# Patient Record
Sex: FEMALE | Employment: UNEMPLOYED | URBAN - METROPOLITAN AREA
[De-identification: names, ages, dates, MRNs, and addresses within clinical notes are randomized per-mention and may not be internally consistent; named-entity substitution may affect disease eponyms.]

---

## 2017-03-23 ENCOUNTER — TELEPHONE (OUTPATIENT)
Dept: FAMILY MEDICINE | Facility: CLINIC | Age: 57
End: 2017-03-23

## 2017-03-23 DIAGNOSIS — E11.9 TYPE 2 DIABETES MELLITUS WITHOUT COMPLICATION (H): ICD-10-CM

## 2017-03-23 NOTE — TELEPHONE ENCOUNTER
metformin         Last Written Prescription Date: 07/28/16  Last Fill Quantity: 180, # refills: 1  Last Office Visit with [facility], UMP or  Health prescribing provider:  09/07/16   Next 5 appointments (look out 90 days)     Mar 27, 2017 11:00 AM CDT   Office Visit with Rachna Aden MD   [facility] ([facility])    [address] [place] [id number] [phone]                   BP Readings from Last 3 Encounters:   09/07/16 120/70   07/28/16 112/76   06/07/16 124/80     Lab Results   Component Value Date    MICROL 22 06/07/2016     No results found for: MICROALBUMIN  Creatinine   Date Value Ref Range Status   07/21/2016 0.62 0.52 - 1.04 mg/dL Final   ]  GFR Estimate   Date Value Ref Range Status   07/21/2016 >90  Non  GFR Calc   >60 mL/min/1.7m2 Final   08/27/2015 >90  Non  GFR Calc   >60 mL/min/1.7m2 Final   04/28/2014 >90 >60 mL/min/1.7m2 Final     GFR Estimate If Black   Date Value Ref Range Status   07/21/2016 >90   GFR Calc   >60 mL/min/1.7m2 Final   08/27/2015 >90   GFR Calc   >60 mL/min/1.7m2 Final   04/28/2014 >90 >60 mL/min/1.7m2 Final     Lab Results   Component Value Date    CHOL 148 07/21/2016     Lab Results   Component Value Date    HDL 46 07/21/2016     Lab Results   Component Value Date    LDL 82 07/21/2016     Lab Results   Component Value Date    TRIG 100 07/21/2016     Lab Results   Component Value Date    CHOLHDLRATIO 3.0 08/27/2015     Lab Results   Component Value Date    AST 16 07/21/2016     Lab Results   Component Value Date    ALT 33 07/21/2016     Lab Results   Component Value Date    A1C 6.5 07/21/2016    A1C 6.2 02/24/2016    A1C 6.0 08/27/2015    A1C 8.0 04/30/2015    A1C 6.6 01/26/2012     Potassium   Date Value Ref Range Status   07/21/2016 4.0 3.4 - 5.3 mmol/L Final       Ok x one refill- patient has appointment scheduled    Portia Iqbal,RN  [facility]  [facility]  [phone]

## 2017-03-23 NOTE — TELEPHONE ENCOUNTER
Reason for Call:  Medication or medication refill:    Do you use a [facility]?  Name of the pharmacy and phone number for the current request:  [facility] in [place]    Name of the medication requested: Medformin    Other request: no    Can we leave a detailed message on this number? YES    Phone number patient can be reached at: Home number on file [phone] (home)    Best Time: anytime    Call taken on 3/23/2017 at 9:56 AM by Ashleigh Nettles

## 2017-03-27 ENCOUNTER — OFFICE VISIT (OUTPATIENT)
Dept: FAMILY MEDICINE | Facility: CLINIC | Age: 57
End: 2017-03-27
Payer: COMMERCIAL

## 2017-03-27 VITALS
HEART RATE: 78 BPM | BODY MASS INDEX: 34.95 KG/M2 | OXYGEN SATURATION: 99 % | DIASTOLIC BLOOD PRESSURE: 81 MMHG | TEMPERATURE: 98.2 F | WEIGHT: 178 LBS | HEIGHT: 60 IN | SYSTOLIC BLOOD PRESSURE: 130 MMHG

## 2017-03-27 DIAGNOSIS — Z71.89 COUNSELING REGARDING ADVANCED DIRECTIVES: ICD-10-CM

## 2017-03-27 DIAGNOSIS — E78.5 HYPERLIPIDEMIA LDL GOAL <100: ICD-10-CM

## 2017-03-27 DIAGNOSIS — E11.9 TYPE 2 DIABETES MELLITUS WITHOUT COMPLICATION, WITHOUT LONG-TERM CURRENT USE OF INSULIN (H): Primary | ICD-10-CM

## 2017-03-27 LAB — HBA1C MFR BLD: 8.4 % (ref 4.3–6)

## 2017-03-27 PROCEDURE — 36415 COLL VENOUS BLD VENIPUNCTURE: CPT | Performed by: FAMILY MEDICINE

## 2017-03-27 PROCEDURE — 99214 OFFICE O/P EST MOD 30 MIN: CPT | Performed by: FAMILY MEDICINE

## 2017-03-27 PROCEDURE — 83036 HEMOGLOBIN GLYCOSYLATED A1C: CPT | Performed by: FAMILY MEDICINE

## 2017-03-27 RX ORDER — ATORVASTATIN CALCIUM 10 MG/1
10 TABLET, FILM COATED ORAL DAILY
Qty: 90 TABLET | Refills: 1 | Status: SHIPPED | OUTPATIENT
Start: 2017-03-27 | End: 2017-08-09

## 2017-03-27 NOTE — PROGRESS NOTES
SUBJECTIVE:                                                    Lis Kevin is a 57 year old female who presents to clinic today for the following health issues:      Diabetes Follow-up    Patient is checking blood sugars: twice daily.    Blood sugar testing frequency justification:   Results are as follows:         am - 119 bed time 130     Diabetic concerns: None, could do better with exercise. Has gained weight      Symptoms of hypoglycemia (low blood sugar): none     Paresthesias (numbness or burning in feet) or sores: No     Date of last diabetic eye exam: last year        Amount of exercise or physical activity: None    Problems taking medications regularly: No    Medication side effects: none    Diet: regular (no restrictions)      Hyperlipidemia Follow-Up      Rate your low fat/cholesterol diet?: good, although not as much exercise lately in winter     Taking statin?  Yes, no muscle aches from statin, need refill on med's     Other lipid medications/supplements?:  none       Problem list and histories reviewed & adjusted, as indicated.  Additional history: as documented    Patient Active Problem List   Diagnosis     Abnormal weight gain     Dyspepsia and other specified disorders of function of stomach     Other acne     Calcific tendonitis     Abnormal LFTs     Abnormal glucose     Other postprocedural status(V45.89)     Dysmetabolic syndrome X     Hyperlipidemia LDL goal <100     Pain in joint, shoulder region     Dry eyes     Hyperplastic colon polyp     Serrated adenoma of colon     Backache     Gall stone     DM type 2 goal A1C below 7.5     Fatty liver     Type 2 diabetes mellitus without complication (H)     Obesity, Class II, BMI 35-39.9, with comorbidity (H)     Symptomatic menopausal or female climacteric states     Bilateral thumb pain     Past Surgical History:   Procedure Laterality Date     C DEXA INTERPRETATION, AXIAL  8/10    normal bone mineral density      C NONSPECIFIC PROCEDURE       "BLT     COLONOSCOPY  3/12    repeat in 2015       Social History   Substance Use Topics     Smoking status: Never Smoker     Smokeless tobacco: Never Used     Alcohol use No     Family History   Problem Relation Age of Onset     DIABETES Sister      Hypertension Mother      Hyperlipidemia No family hx of      Coronary Artery Disease No family hx of      Cancer - colorectal No family hx of      Breast Cancer No family hx of            Reviewed and updated as needed this visit by clinical staff  Tobacco  Allergies  Meds       Reviewed and updated as needed this visit by Provider         ROS:  Constitutional, HEENT, cardiovascular, pulmonary, GI, , musculoskeletal, neuro, skin, endocrine and psych systems are negative, except as otherwise noted.    OBJECTIVE:                                                    /81 (BP Location: Left arm, Cuff Size: Adult Large)  Pulse 78  Temp 98.2  F (36.8  C) (Tympanic)  Ht 4' 11.84\" (1.52 m)  Wt 178 lb (80.7 kg)  LMP 06/10/2009  SpO2 99%  BMI 34.95 kg/m2  Body mass index is 34.95 kg/(m^2).  GENERAL: healthy, alert and no distress  NECK: no adenopathy, no asymmetry, masses, or scars and thyroid normal to palpation  RESP: lungs clear to auscultation - no rales, rhonchi or wheezes  CV: regular rate and rhythm, normal S1 S2, no S3 or S4,   ABDOMEN: soft, nontender, no hepatosplenomegaly, no masses and bowel sounds normal  MS:, no edema           ASSESSMENT/PLAN:                                                        (E11.9) Type 2 diabetes mellitus without complication, without long-term current use of insulin (H)  (primary encounter diagnosis)  Comment:   Plan: HEMOGLOBIN A1C, metFORMIN (GLUCOPHAGE) 500 MG         tablet            (E78.5) Hyperlipidemia LDL goal <100  Comment:   Plan: atorvastatin (LIPITOR) 10 MG tablet            (Z71.89) Counseling regarding advanced directives  Comment:   Plan: HONORING CHOICES REFERRAL            (Z68.35) BMI " 35.0-35.9,adult  Comment:   Plan: Healthy diet and exercise reviewed.   Risks of obesity discussed.  Encourage exercise.      Check labs  Refill sent  See pt instruction   Patient expressed understanding and agreement with treatment plan. All patient's questions were answered, will let me know if has more later.  Medications: Rx's: Reviewed the potential side effects/complications of medications prescribed.       Rachna Aden MD  [facility]

## 2017-03-27 NOTE — LETTER
[facility]   [address] [place] [address]   [phone]      March 29, 2017    Lis Kevin  [address] [place] [address]            Dear Ms. Kevin    Hemoglobin A1C ( DM test) is high and shows inadequate control of your diabetes. You  have to increase your dose of metformin to two tab daily.    You will need to monitor glucose closely.     . Also should  continue to manage your DM with diet control and exercise. You should do retest  in 2-3  months    I suggest you do follow up office visit check to discuss results and adjust your med's if you have questions or concerns.     Results for orders placed or performed in visit on 03/27/17   HEMOGLOBIN A1C   Result Value Ref Range    Hemoglobin A1C 8.4 (H) 4.3 - 6.0 %             Sincerely,   Rachna Aden M.D.

## 2017-03-27 NOTE — MR AVS SNAPSHOT
After Visit Summary   3/27/2017    Lis Kevin    MRN: [id number]           Patient Information     Date Of Birth          1960        Visit Information        Provider Department      3/27/2017 10:45 AM Rachna Aden MD; [facility]        Today's Diagnoses     Type 2 diabetes mellitus without complication, without long-term current use of insulin (H)    -  1    Hyperlipidemia LDL goal <100        Counseling regarding advanced directives        BMI 35.0-35.9,adult          Care Instructions    Check labs  Refill sent  Please schedule eye exam  Follow up check in 06/2017, sooner if problem         Follow-ups after your visit        Additional Services     HONORING CHOICES REFERRAL       Your provider has referred you to Advance Directive Counseling with our [employer] [facility].    Reason for Referral: Facilitate General Advance Care Planning    The [employer] Representative will call you to schedule your appointment, unless your appointment was already scheduled at your clinic.  You may also call the [employer] Representative at [phone] to schedule your appointment.                  Who to contact     If you have questions or need follow up information about today's clinic visit or your schedule please contact [facility] directly at [phone].  Normal or non-critical lab and imaging results will be communicated to you by MyChart, letter or phone within 4 business days after the clinic has received the results. If you do not hear from us within 7 days, please contact the clinic through MyChart or phone. If you have a critical or abnormal lab result, we will notify you by phone as soon as possible.  Submit refill requests through [employer] or call your pharmacy and they will forward the refill request to us. Please allow 3 business days for your refill to be completed.          Additional  "Information About Your Visit        MyChart Information     [employer] lets you send messages to your doctor, view your test results, renew your prescriptions, schedule appointments and more. To sign up, go to [email or web address] . Click on \"Log in\" on the left side of the screen, which will take you to the Welcome page. Then click on \"Sign up Now\" on the right side of the page.     You will be asked to enter the access code listed below, as well as some personal information. Please follow the directions to create your username and password.     Your access code is: [email or web address]  Expires: 2017 11:40 AM     Your access code will  in 90 days. If you need help or a new code, please call your [facility] clinic or [phone].        Care EveryWhere ID     This is your Care EveryWhere ID. This could be used by other organizations to access your [facility] medical records  [id number]        Your Vitals Were     Pulse Temperature Height Last Period Pulse Oximetry BMI (Body Mass Index)    78 98.2  F (36.8  C) (Tympanic) 4' 11.84\" (1.52 m) 06/10/2009 99% 34.95 kg/m2       Blood Pressure from Last 3 Encounters:   17 130/81   16 120/70   16 112/76    Weight from Last 3 Encounters:   17 178 lb (80.7 kg)   16 177 lb (80.3 kg)   16 173 lb 6.4 oz (78.7 kg)              We Performed the Following     HEMOGLOBIN A1C     HONORING CHOICES REFERRAL          Where to get your medicines      These medications were sent to [id number] - [place], [place] - [address] AT [facility] [address] [place] [address]  [address], [place] [address]    Hours:  24-hours Phone:  [phone]     atorvastatin 10 MG tablet    metFORMIN 500 MG tablet          Primary Care Provider Office Phone # Fax #    Rachna Aden [phone]       [facility] [place] [address] [place] [address] [address]  [place] [address]        Thank you!     Thank you for choosing [facility] [place]  for your care. Our goal is always to provide you with excellent care. Hearing back from our patients is one way we can continue to improve our services. Please take a few minutes to complete the written survey that you may receive in the mail after your visit with us. Thank you!             Your Updated Medication List - Protect others around you: Learn how to safely use, store and throw away your medicines at www.disposemymeds.org.          This list is accurate as of: 3/27/17 11:40 AM.  Always use your most recent med list.                   Brand Name Dispense Instructions for use    ACE NOT PRESCRIBED (INTENTIONAL)     0 each    ACE Inhibitor not prescribed due to Other:not needed       aspirin 81 MG EC tablet     90 tablet    Take 1 tablet (81 mg) by mouth daily       atorvastatin 10 MG tablet    LIPITOR    90 tablet    Take 1 tablet (10 mg) by mouth daily       blood glucose monitoring test strip    ACCU-CHEK SMARTVIEW    100 strip    Test 3 times a day       metFORMIN 500 MG tablet    GLUCOPHAGE    180 tablet    Take 1 tablet (500 mg) by mouth 2 times daily (with meals)       vitamin B complex with vitamin C Tabs tablet      Take 1 tablet by mouth daily       vitamin D 2000 UNITS tablet      Take 1 tablet by mouth daily.

## 2017-03-31 DIAGNOSIS — E11.9 TYPE 2 DIABETES MELLITUS WITHOUT COMPLICATION, WITHOUT LONG-TERM CURRENT USE OF INSULIN (H): ICD-10-CM

## 2017-05-01 ENCOUNTER — OFFICE VISIT (OUTPATIENT)
Dept: FAMILY MEDICINE | Facility: CLINIC | Age: 57
End: 2017-05-01
Payer: COMMERCIAL

## 2017-05-01 VITALS
BODY MASS INDEX: 36.29 KG/M2 | DIASTOLIC BLOOD PRESSURE: 64 MMHG | OXYGEN SATURATION: 100 % | HEART RATE: 64 BPM | SYSTOLIC BLOOD PRESSURE: 112 MMHG | WEIGHT: 180 LBS | TEMPERATURE: 97.2 F | RESPIRATION RATE: 16 BRPM | HEIGHT: 59 IN

## 2017-05-01 DIAGNOSIS — N94.9 VAGINAL DISCOMFORT: ICD-10-CM

## 2017-05-01 DIAGNOSIS — N30.00 ACUTE CYSTITIS WITHOUT HEMATURIA: Primary | ICD-10-CM

## 2017-05-01 DIAGNOSIS — R30.9 PAIN WITH URINATION: ICD-10-CM

## 2017-05-01 DIAGNOSIS — E11.9 TYPE 2 DIABETES MELLITUS WITHOUT COMPLICATION, WITHOUT LONG-TERM CURRENT USE OF INSULIN (H): ICD-10-CM

## 2017-05-01 LAB
BACTERIA #/AREA URNS HPF: ABNORMAL /HPF
MICRO REPORT STATUS: NORMAL
NON-SQ EPI CELLS #/AREA URNS LPF: ABNORMAL /LPF
RBC #/AREA URNS AUTO: ABNORMAL /HPF (ref 0–2)
SPECIMEN SOURCE: NORMAL
WBC #/AREA URNS AUTO: ABNORMAL /HPF (ref 0–2)
WET PREP SPEC: NORMAL

## 2017-05-01 PROCEDURE — 87210 SMEAR WET MOUNT SALINE/INK: CPT | Performed by: PHYSICIAN ASSISTANT

## 2017-05-01 PROCEDURE — 99213 OFFICE O/P EST LOW 20 MIN: CPT | Performed by: PHYSICIAN ASSISTANT

## 2017-05-01 PROCEDURE — 81015 MICROSCOPIC EXAM OF URINE: CPT | Performed by: PHYSICIAN ASSISTANT

## 2017-05-01 RX ORDER — NITROFURANTOIN 25; 75 MG/1; MG/1
100 CAPSULE ORAL 2 TIMES DAILY
Qty: 14 CAPSULE | Refills: 0 | Status: SHIPPED | OUTPATIENT
Start: 2017-05-01 | End: 2018-02-08

## 2017-05-01 NOTE — MR AVS SNAPSHOT
"              After Visit Summary   5/1/2017    Lis Kevin    MRN: [id number]           Patient Information     Date Of Birth          1960        Visit Information        Provider Department      5/1/2017 12:40 PM Sofie Downing PA-C [facility] [place]        Today's Diagnoses     Acute cystitis without hematuria    -  1    Pain with urination        Obesity, Class II, BMI 35-39.9, with comorbidity (H)        Type 2 diabetes mellitus without complication, without long-term current use of insulin (H)        Vaginal discomfort           Follow-ups after your visit        Follow-up notes from your care team     Return if symptoms worsen or fail to improve.      Who to contact     If you have questions or need follow up information about today's clinic visit or your schedule please contact [facility] [place] directly at [phone].  Normal or non-critical lab and imaging results will be communicated to you by MyChart, letter or phone within 4 business days after the clinic has received the results. If you do not hear from us within 7 days, please contact the clinic through [employer] or phone. If you have a critical or abnormal lab result, we will notify you by phone as soon as possible.  Submit refill requests through [employer] or call your pharmacy and they will forward the refill request to us. Please allow 3 business days for your refill to be completed.          Additional Information About Your Visit        MyChart Information     [employer] lets you send messages to your doctor, view your test results, renew your prescriptions, schedule appointments and more. To sign up, go to [email or web address] . Click on \"Log in\" on the left side of the screen, which will take you to the Welcome page. Then click on \"Sign up Now\" on the right side of the page.     You will be asked to enter the access code listed below, as well as some personal information. Please follow the directions " "to create your username and password.     Your access code is: [email or web address]  Expires: 2017 11:40 AM     Your access code will  in 90 days. If you need help or a new code, please call your [facility] clinic or [phone].        Care EveryWhere ID     This is your Care EveryWhere ID. This could be used by other organizations to access your [facility] medical records  [id number]        Your Vitals Were     Pulse Temperature Respirations Height Last Period Pulse Oximetry    64 97.2  F (36.2  C) 16 4' 11\" (1.499 m) 06/10/2009 100%    BMI (Body Mass Index)                   36.36 kg/m2            Blood Pressure from Last 3 Encounters:   17 112/64   17 130/81   16 120/70    Weight from Last 3 Encounters:   17 180 lb (81.6 kg)   17 178 lb (80.7 kg)   16 177 lb (80.3 kg)              We Performed the Following     Urine Microscopic     Wet prep          Today's Medication Changes          These changes are accurate as of: 17  2:27 PM.  If you have any questions, ask your nurse or doctor.               Start taking these medicines.        Dose/Directions    nitrofurantoin (macrocrystal-monohydrate) 100 MG capsule   Commonly known as:  MACROBID   Used for:  Acute cystitis without hematuria   Started by:  Sofie Downing PA-C        Dose:  100 mg   Take 1 capsule (100 mg) by mouth 2 times daily   Quantity:  14 capsule   Refills:  0            Where to get your medicines      These medications were sent to [facility] [place] - [place], [place] - [address] [place] [address], [place] [address]     Phone:  [phone]     nitrofurantoin (macrocrystal-monohydrate) 100 MG capsule                Primary Care Provider Office Phone # Fax #    Rachna Aden [phone]       [facility] [place] [address] [place] [address] [address]  [place] [address]        Thank you!     Thank you for choosing [facility] [place]  " for your care. Our goal is always to provide you with excellent care. Hearing back from our patients is one way we can continue to improve our services. Please take a few minutes to complete the written survey that you may receive in the mail after your visit with us. Thank you!             Your Updated Medication List - Protect others around you: Learn how to safely use, store and throw away your medicines at www.disposemymeds.org.          This list is accurate as of: 5/1/17  2:27 PM.  Always use your most recent med list.                   Brand Name Dispense Instructions for use    ACE NOT PRESCRIBED (INTENTIONAL)     0 each    ACE Inhibitor not prescribed due to Other:not needed       aspirin 81 MG EC tablet     90 tablet    Take 1 tablet (81 mg) by mouth daily       atorvastatin 10 MG tablet    LIPITOR    90 tablet    Take 1 tablet (10 mg) by mouth daily       blood glucose monitoring test strip    ACCU-CHEK SMARTVIEW    100 strip    Test 3 times a day       metFORMIN 500 MG tablet    GLUCOPHAGE    180 tablet    Take 2 tablets (1,000 mg) by mouth 2 times daily (with meals)       nitrofurantoin (macrocrystal-monohydrate) 100 MG capsule    MACROBID    14 capsule    Take 1 capsule (100 mg) by mouth 2 times daily       vitamin B complex with vitamin C Tabs tablet      Take 1 tablet by mouth daily       vitamin D 2000 UNITS tablet      Take 1 tablet by mouth daily.

## 2017-05-01 NOTE — PROGRESS NOTES
"Chief Complaint   Patient presents with     UTI       Initial /64  Pulse 64  Temp 97.2  F (36.2  C)  Resp 16  Ht 4' 11\" (1.499 m)  Wt 180 lb (81.6 kg)  LMP 06/10/2009  SpO2 100%  BMI 36.36 kg/m2 Estimated body mass index is 36.36 kg/(m^2) as calculated from the following:    Height as of this encounter: 4' 11\" (1.499 m).    Weight as of this encounter: 180 lb (81.6 kg).  Medication Reconciliation: complete. MARNI Camarena LPN          SUBJECTIVE:                                                    Lis Kevin is a 57 year old female who presents to clinic today for the following health issues:      URINARY TRACT SYMPTOMS     Onset: 2 weeks    Description:   Painful urination (Dysuria): YES  Blood in urine (Hematuria): no  Delay in urine (Hesitency): no     Intensity: moderate    Progression of Symptoms:  worsening    Accompanying Signs & Symptoms:  Fever/chills: no   Flank pain no  Nausea and vomiting: no   Any vaginal symptoms: none  Abdominal/Pelvic Pain: no   History:   History of frequent UTI's: YES  History of kidney stones: no   Sexually Active: YES  Possibility of pregnancy: No    Precipitating factors:   Was tx'ed 4/26/17 at [facility] rx'ed Sulfameth/tmp 800mg  1 tab BID - finished last night         Therapies Tried and outcome: Azo and Increase fluid intake    Patient was seen in [facility] last week and had cx-confirmed UTI showing E coli sensitive to bactrim, cipro, nitrofurantoin (per care everywhere). She finished 5 day course bactrim and not feeeling better.     -------------------------------------    Problem list and histories reviewed & adjusted, as indicated.  Additional history: as documented    Patient Active Problem List   Diagnosis     Abnormal weight gain     Dyspepsia and other specified disorders of function of stomach     Other acne     Calcific tendonitis     Abnormal LFTs     Abnormal glucose     Other postprocedural status(V45.89)     Dysmetabolic syndrome X     Hyperlipidemia " LDL goal <100     Pain in joint, shoulder region     Dry eyes     Hyperplastic colon polyp     Serrated adenoma of colon     Backache     Gall stone     DM type 2 goal A1C below 7.5     Fatty liver     Type 2 diabetes mellitus without complication (H)     Obesity, Class II, BMI 35-39.9, with comorbidity (H)     Symptomatic menopausal or female climacteric states     Bilateral thumb pain     Past Surgical History:   Procedure Laterality Date     C DEXA INTERPRETATION, AXIAL  8/10    normal bone mineral density      C NONSPECIFIC PROCEDURE      BLT     COLONOSCOPY  3/12    repeat in 2015       Social History   Substance Use Topics     Smoking status: Never Smoker     Smokeless tobacco: Never Used     Alcohol use No     Family History   Problem Relation Age of Onset     Hypertension Mother      DIABETES Sister      Hyperlipidemia No family hx of      Coronary Artery Disease No family hx of      Cancer - colorectal No family hx of      Breast Cancer No family hx of          Current Outpatient Prescriptions   Medication Sig Dispense Refill     nitrofurantoin, macrocrystal-monohydrate, (MACROBID) 100 MG capsule Take 1 capsule (100 mg) by mouth 2 times daily 14 capsule 0     metFORMIN (GLUCOPHAGE) 500 MG tablet Take 2 tablets (1,000 mg) by mouth 2 times daily (with meals) 180 tablet 1     atorvastatin (LIPITOR) 10 MG tablet Take 1 tablet (10 mg) by mouth daily 90 tablet 1     vitamin  B complex with vitamin C (VITAMIN  B COMPLEX) TABS Take 1 tablet by mouth daily       aspirin 81 MG EC tablet Take 1 tablet (81 mg) by mouth daily 90 tablet 3     ACE NOT PRESCRIBED, INTENTIONAL, ACE Inhibitor not prescribed due to Other:not needed 0 each 0     blood glucose (ACCU-CHEK SMARTVIEW) test strip Test 3 times a day 100 strip 5     Cholecalciferol (VITAMIN D) 2000 UNIT tablet Take 1 tablet by mouth daily.       Allergies   Allergen Reactions     No Known Allergies      Labs reviewed in EPIC    Reviewed and updated as needed this  "visit by clinical staff  Tobacco  Allergies  Meds  Fam Hx  Soc Hx      Reviewed and updated as needed this visit by Provider         Social History     Social History     Marital status:      Spouse name:      Number of children: 2     Years of education: N/A     Occupational History     unemployed      Social History Main Topics     Smoking status: Never Smoker     Smokeless tobacco: Never Used     Alcohol use No     Drug use: No     Sexual activity: Yes     Partners: Male     Birth control/ protection: Surgical     Other Topics Concern      Service No     Blood Transfusions No     Caffeine Concern No     Occupational Exposure No     Hobby Hazards No     Sleep Concern Yes     Stress Concern Yes      work     Weight Concern No     Special Diet No     Back Care No     Exercise No     Bike Helmet No     Seat Belt Yes     Self-Exams Yes     Social History Narrative    , 2 kids, non smoker, no alcohol, not working        10 point review of systems negative other than symptoms noted above.   Constitutional, HEENT, CV, pulmonary, GI, , MS, Endo, Psych systems are all negative, except as otherwise noted.       OBJECTIVE:  /64  Pulse 64  Temp 97.2  F (36.2  C)  Resp 16  Ht 4' 11\" (1.499 m)  Wt 180 lb (81.6 kg)  LMP 06/10/2009  SpO2 100%  BMI 36.36 kg/m2  CONSTITUTIONAL: Alert, well-nourished, well-groomed, NAD  RESP: Lungs CTA. No wheeze, rhonchi, rales. Normal effort on room air. Equal lung sounds bilaterally.   CV: HRRR, normal S1, S2. No MRG. No peripheral edema.  DERM: No rashes or suspicious lesions  GI: Abdomen flat. BS x 4. No TTP. No HSM or masses. No CVAT      Diagnostic Tests:  Results for orders placed or performed in visit on 05/01/17   Urine Microscopic   Result Value Ref Range    WBC Urine O - 2 0 - 2 /HPF    RBC Urine O - 2 0 - 2 /HPF    Squamous Epithelial /LPF Urine Few FEW /LPF    Bacteria Urine Few (A) NEG /HPF   Wet prep   Result Value Ref Range    " Specimen Description Vagina     Wet Prep       No Trichomonas seen  No clue cells seen  No yeast seen      Micro Report Status FINAL 05/01/2017          ASSESSMENT/PLAN:  (N30.00) Acute cystitis without hematuria  (primary encounter diagnosis)  Comment: will re-tx with macrobid per sensitivity.   Urine today looks OK. Wet prep neg.   Plan: nitrofurantoin, macrocrystal-monohydrate,         (MACROBID) 100 MG capsule            (R30.9) Pain with urination  Comment:   Plan: Urine Microscopic, CANCELED: *UA reflex to         Microscopic            (E66.01) Obesity, Class II, BMI 35-39.9, with comorbidity (H)  Comment: Plan: LSMs for weight loss.     (E11.9) Type 2 diabetes mellitus without complication, without long-term current use of insulin (H)  Comment:   Plan: Per PCP    (N94.9) Vaginal discomfort  Comment:   Plan: Wet prep              FOLLOW-UP: Routinely and sooner as needed.  The patient agrees with this assessment and plan and agrees to call or return to the clinic with any questions or concerns or if their condition worsens.    DONAVNA Baker, PA-C  [facility]

## 2017-07-18 ENCOUNTER — TRANSFERRED RECORDS (OUTPATIENT)
Dept: HEALTH INFORMATION MANAGEMENT | Facility: CLINIC | Age: 57
End: 2017-07-18

## 2017-08-09 ENCOUNTER — OFFICE VISIT (OUTPATIENT)
Dept: FAMILY MEDICINE | Facility: CLINIC | Age: 57
End: 2017-08-09
Payer: COMMERCIAL

## 2017-08-09 ENCOUNTER — RADIANT APPOINTMENT (OUTPATIENT)
Dept: MAMMOGRAPHY | Facility: CLINIC | Age: 57
End: 2017-08-09
Payer: COMMERCIAL

## 2017-08-09 VITALS
WEIGHT: 177 LBS | OXYGEN SATURATION: 97 % | SYSTOLIC BLOOD PRESSURE: 127 MMHG | HEART RATE: 57 BPM | TEMPERATURE: 97.1 F | HEIGHT: 59 IN | DIASTOLIC BLOOD PRESSURE: 80 MMHG | BODY MASS INDEX: 35.68 KG/M2

## 2017-08-09 DIAGNOSIS — E11.9 TYPE 2 DIABETES MELLITUS WITHOUT COMPLICATION, WITHOUT LONG-TERM CURRENT USE OF INSULIN (H): ICD-10-CM

## 2017-08-09 DIAGNOSIS — E78.5 HYPERLIPIDEMIA LDL GOAL <100: ICD-10-CM

## 2017-08-09 DIAGNOSIS — Z12.31 VISIT FOR SCREENING MAMMOGRAM: ICD-10-CM

## 2017-08-09 DIAGNOSIS — K76.0 FATTY LIVER: ICD-10-CM

## 2017-08-09 DIAGNOSIS — Z00.00 ROUTINE GENERAL MEDICAL EXAMINATION AT A HEALTH CARE FACILITY: Primary | ICD-10-CM

## 2017-08-09 DIAGNOSIS — M25.562 ACUTE PAIN OF LEFT KNEE: ICD-10-CM

## 2017-08-09 LAB
ALBUMIN SERPL-MCNC: 4 G/DL (ref 3.4–5)
ALP SERPL-CCNC: 108 U/L (ref 40–150)
ALT SERPL W P-5'-P-CCNC: 37 U/L (ref 0–50)
ANION GAP SERPL CALCULATED.3IONS-SCNC: 8 MMOL/L (ref 3–14)
AST SERPL W P-5'-P-CCNC: 34 U/L (ref 0–45)
BILIRUB SERPL-MCNC: 0.5 MG/DL (ref 0.2–1.3)
BUN SERPL-MCNC: 10 MG/DL (ref 7–30)
CALCIUM SERPL-MCNC: 9.2 MG/DL (ref 8.5–10.1)
CHLORIDE SERPL-SCNC: 108 MMOL/L (ref 94–109)
CHOLEST SERPL-MCNC: 148 MG/DL
CO2 SERPL-SCNC: 24 MMOL/L (ref 20–32)
CREAT SERPL-MCNC: 0.65 MG/DL (ref 0.52–1.04)
CREAT UR-MCNC: 80 MG/DL
GFR SERPL CREATININE-BSD FRML MDRD: ABNORMAL ML/MIN/1.7M2
GLUCOSE SERPL-MCNC: 148 MG/DL (ref 70–99)
HBA1C MFR BLD: 6.6 % (ref 4.3–6)
HDLC SERPL-MCNC: 44 MG/DL
LDLC SERPL CALC-MCNC: 77 MG/DL
MICROALBUMIN UR-MCNC: 7 MG/L
MICROALBUMIN/CREAT UR: 9.24 MG/G CR (ref 0–25)
NONHDLC SERPL-MCNC: 104 MG/DL
POTASSIUM SERPL-SCNC: 4.3 MMOL/L (ref 3.4–5.3)
PROT SERPL-MCNC: 7.8 G/DL (ref 6.8–8.8)
SODIUM SERPL-SCNC: 140 MMOL/L (ref 133–144)
TRIGL SERPL-MCNC: 133 MG/DL
TSH SERPL DL<=0.005 MIU/L-ACNC: 2.82 MU/L (ref 0.4–4)

## 2017-08-09 PROCEDURE — 84443 ASSAY THYROID STIM HORMONE: CPT | Performed by: FAMILY MEDICINE

## 2017-08-09 PROCEDURE — 80061 LIPID PANEL: CPT | Performed by: FAMILY MEDICINE

## 2017-08-09 PROCEDURE — 99396 PREV VISIT EST AGE 40-64: CPT | Performed by: FAMILY MEDICINE

## 2017-08-09 PROCEDURE — 80053 COMPREHEN METABOLIC PANEL: CPT | Performed by: FAMILY MEDICINE

## 2017-08-09 PROCEDURE — 83036 HEMOGLOBIN GLYCOSYLATED A1C: CPT | Performed by: FAMILY MEDICINE

## 2017-08-09 PROCEDURE — 82043 UR ALBUMIN QUANTITATIVE: CPT | Performed by: FAMILY MEDICINE

## 2017-08-09 PROCEDURE — 99213 OFFICE O/P EST LOW 20 MIN: CPT | Mod: 25 | Performed by: FAMILY MEDICINE

## 2017-08-09 PROCEDURE — G0202 SCR MAMMO BI INCL CAD: HCPCS | Mod: TC

## 2017-08-09 PROCEDURE — 36415 COLL VENOUS BLD VENIPUNCTURE: CPT | Performed by: FAMILY MEDICINE

## 2017-08-09 RX ORDER — NABUMETONE 500 MG/1
500-1000 TABLET, FILM COATED ORAL 2 TIMES DAILY PRN
Qty: 30 TABLET | Refills: 0 | Status: SHIPPED | OUTPATIENT
Start: 2017-08-09 | End: 2018-09-13

## 2017-08-09 RX ORDER — ATORVASTATIN CALCIUM 10 MG/1
10 TABLET, FILM COATED ORAL DAILY
Qty: 90 TABLET | Refills: 1 | Status: SHIPPED | OUTPATIENT
Start: 2017-08-09 | End: 2018-02-01

## 2017-08-09 NOTE — MR AVS SNAPSHOT
After Visit Summary   8/9/2017    Lis Kevin    MRN: [id number]           Patient Information     Date Of Birth          1960        Visit Information        Provider Department      8/9/2017 8:30 AM Rachna Aden MD; [facility] [employer] [facility]        Today's Diagnoses     Routine general medical examination at a health care facility    -  1    Type 2 diabetes mellitus without complication, without long-term current use of insulin (H)        Obesity, Class II, BMI 35-39.9, with comorbidity        Symptomatic menopausal or female climacteric states        Fatty liver        Hyperlipidemia LDL goal <100        Acute pain of left knee          Care Instructions      Preventive Health Recommendations  Female Ages 50 - 64    Yearly exam: See your health care provider every year in order to  o Review health changes.   o Discuss preventive care.    o Review your medicines if your doctor has prescribed any.      Get a Pap test every three years (unless you have an abnormal result and your provider advises testing more often).    If you get Pap tests with HPV test, you only need to test every 5 years, unless you have an abnormal result.     You do not need a Pap test if your uterus was removed (hysterectomy) and you have not had cancer.    You should be tested each year for STDs (sexually transmitted diseases) if you're at risk.     Have a mammogram every 1 to 2 years.    Have a colonoscopy at age 50, or have a yearly FIT test (stool test). These exams screen for colon cancer.      Have a cholesterol test every 5 years, or more often if advised.    Have a diabetes test (fasting glucose) every three years. If you are at risk for diabetes, you should have this test more often.     If you are at risk for osteoporosis (brittle bone disease), think about having a bone density scan (DEXA).    Shots: Get a flu shot each year. Get a tetanus shot every 10  years.    Nutrition:     Eat at least 5 servings of fruits and vegetables each day.    Eat whole-grain bread, whole-wheat pasta and brown rice instead of white grains and rice.    Talk to your provider about Calcium and Vitamin D.     Lifestyle    Exercise at least 150 minutes a week (30 minutes a day, 5 days a week). This will help you control your weight and prevent disease.    Limit alcohol to one drink per day.    No smoking.     Wear sunscreen to prevent skin cancer.     See your dentist every six months for an exam and cleaning.    See your eye doctor every 1 to 2 years.            Follow-ups after your visit        Your next 10 appointments already scheduled     Aug 09, 2017  9:15 AM CDT   MA SCREENING DIGITAL BILATERAL with ECMA1   [facility] ([facility])    [address] [place] [id number] [phone]           Do not use any powder, lotion or deodorant under your arms or on your breast. If you do, we will ask you to remove it before your exam.  Wear comfortable, two-piece clothing.  If you have any allergies, tell your care team.  Bring any previous mammograms from other facilities or have them mailed to the breast center.              Who to contact     If you have questions or need follow up information about today's clinic visit or your schedule please contact [facility] directly at [phone].  Normal or non-critical lab and imaging results will be communicated to you by MyChart, letter or phone within 4 business days after the clinic has received the results. If you do not hear from us within 7 days, please contact the clinic through MyChart or phone. If you have a critical or abnormal lab result, we will notify you by phone as soon as possible.  Submit refill requests through [employer] or call your pharmacy and they will forward the refill request to us. Please allow 3 business days for your refill to be completed.        "   Additional Information About Your Visit        MyChart Information     [employer] lets you send messages to your doctor, view your test results, renew your prescriptions, schedule appointments and more. To sign up, go to [email or web address] . Click on \"Log in\" on the left side of the screen, which will take you to the Welcome page. Then click on \"Sign up Now\" on the right side of the page.     You will be asked to enter the access code listed below, as well as some personal information. Please follow the directions to create your username and password.     Your access code is: [email or web address]  Expires: 2017  9:07 AM     Your access code will  in 90 days. If you need help or a new code, please call your [place] clinic or [phone].        Care EveryWhere ID     This is your Care EveryWhere ID. This could be used by other organizations to access your [place] medical records  [id number]        Your Vitals Were     Pulse Temperature Height Last Period Pulse Oximetry BMI (Body Mass Index)    57 97.1  F (36.2  C) (Tympanic) 4' 11\" (1.499 m) 06/10/2009 97% 35.75 kg/m2       Blood Pressure from Last 3 Encounters:   17 127/80   17 112/64   17 130/81    Weight from Last 3 Encounters:   17 177 lb (80.3 kg)   17 180 lb (81.6 kg)   17 178 lb (80.7 kg)              We Performed the Following     Albumin Random Urine Quantitative     Comprehensive metabolic panel     FOOT EXAM     Hemoglobin A1c     LIPID REFLEX TO DIRECT LDL PANEL     TSH with free T4 reflex          Today's Medication Changes          These changes are accurate as of: 17  9:07 AM.  If you have any questions, ask your nurse or doctor.               Start taking these medicines.        Dose/Directions    nabumetone 500 MG tablet   Commonly known as:  RELAFEN   Used for:  Acute pain of left knee   Started by:  Rachna Aden MD        Dose:  500-1000 mg   Take 1-2 tablets (500-1,000 mg) by mouth 2 " times daily as needed for moderate pain   Quantity:  30 tablet   Refills:  0         These medicines have changed or have updated prescriptions.        Dose/Directions    blood glucose monitoring test strip   Commonly known as:  ACCU-CHEK SMARTVIEW   This may have changed:  additional instructions   Used for:  Type 2 diabetes mellitus without complication, without long-term current use of insulin (H)   Changed by:  Rachna Aden MD        Test 1-2  times a day   Quantity:  100 strip   Refills:  5            Where to get your medicines      These medications were sent to [id number] - [place], [place] - [address] AT [facility] [place]\[facility] [place] [facility]\""[facility] [address]  [address], [place] [address]    Hours:  24-hours Phone:  [phone]     atorvastatin 10 MG tablet    blood glucose monitoring test strip    metFORMIN 500 MG tablet    nabumetone 500 MG tablet                Primary Care Provider Office Phone # Fax #    Rachna Aden [phone]       [address]  [place] [address]        Equal Access to Services     [facility]: Hadii aad ku hadasho Soomaali, waaxda luqadaha, qaybta kaalmada adeegyada, waxay idiin hayterryn severino copeland . So [facility] [phone].    ATENCIÓN: Si habla español, tiene a crum disposición servicios gratuitos de asistencia lingüística. Llame al [phone].    We comply with applicable federal civil rights laws and [place] laws. We do not discriminate on the basis of race, color, national origin, age, disability sex, sexual orientation or gender identity.            Thank you!     Thank you for choosing [facility] [place]  for your care. Our goal is always to provide you with excellent care. Hearing back from our patients is one way we can continue to improve our services. Please take a few minutes to complete the written survey that you may receive in the mail after your visit with us. Thank you!             Your  Updated Medication List - Protect others around you: Learn how to safely use, store and throw away your medicines at www.disposemymeds.org.          This list is accurate as of: 8/9/17  9:07 AM.  Always use your most recent med list.                   Brand Name Dispense Instructions for use Diagnosis    ACE NOT PRESCRIBED (INTENTIONAL)     0 each    ACE Inhibitor not prescribed due to Other:not needed    Type 2 diabetes mellitus without complication (H)       aspirin 81 MG EC tablet     90 tablet    Take 1 tablet (81 mg) by mouth daily    Type 2 diabetes mellitus without complication (H)       atorvastatin 10 MG tablet    LIPITOR    90 tablet    Take 1 tablet (10 mg) by mouth daily    Hyperlipidemia LDL goal <100       blood glucose monitoring test strip    ACCU-CHEK SMARTVIEW    100 strip    Test 1-2  times a day    Type 2 diabetes mellitus without complication, without long-term current use of insulin (H)       metFORMIN 500 MG tablet    GLUCOPHAGE    180 tablet    Take 2 tablets (1,000 mg) by mouth 2 times daily (with meals)    Type 2 diabetes mellitus without complication, without long-term current use of insulin (H)       nabumetone 500 MG tablet    RELAFEN    30 tablet    Take 1-2 tablets (500-1,000 mg) by mouth 2 times daily as needed for moderate pain    Acute pain of left knee       nitroFURantoin (macrocrystal-monohydrate) 100 MG capsule    MACROBID    14 capsule    Take 1 capsule (100 mg) by mouth 2 times daily    Acute cystitis without hematuria       vitamin B complex with vitamin C Tabs tablet      Take 1 tablet by mouth daily        vitamin D 2000 UNITS tablet      Take 1 tablet by mouth daily.

## 2017-08-09 NOTE — LETTER
Analisa Herberth  [address]  [place] [address]        August 14, 2017          Dear Ms.Herberth,    We are writing to inform you of your test results.     Normal thyroid ( TSH)   Normal urine micro albumin (diabetes test for kidneys)   Normal complete metabolic panel that includes your liver function tests, kidney functions, calcium and  electrolytes(various salts in your body)     Hemoglobin A1C ( DM test)  Looks good.     Normal lipid panel except slightly low HDL (good cholesterol). Low fat, High fiber, diet/ exercise can improve this number. Ok to watch and recheck in one year   Stay on same medications and continue to work  with diet control and exercise .recheck in 6 months     Test results indicate you may require additional follow up, see comment below.    Resulted Orders   LIPID REFLEX TO DIRECT LDL PANEL   Result Value Ref Range    Cholesterol 148 <200 mg/dL    Triglycerides 133 <150 mg/dL      Comment:      Fasting specimen    HDL Cholesterol 44 (L) >49 mg/dL    LDL Cholesterol Calculated 77 <100 mg/dL      Comment:      Desirable:       <100 mg/dl    Non HDL Cholesterol 104 <130 mg/dL   Comprehensive metabolic panel   Result Value Ref Range    Sodium 140 133 - 144 mmol/L    Potassium 4.3 3.4 - 5.3 mmol/L    Chloride 108 94 - 109 mmol/L    Carbon Dioxide 24 20 - 32 mmol/L    Anion Gap 8 3 - 14 mmol/L    Glucose 148 (H) 70 - 99 mg/dL      Comment:      Fasting specimen    Urea Nitrogen 10 7 - 30 mg/dL    Creatinine 0.65 0.52 - 1.04 mg/dL    GFR Estimate >90  Non  GFR Calc   >60 mL/min/1.7m2    GFR Estimate If Black >90   GFR Calc   >60 mL/min/1.7m2    Calcium 9.2 8.5 - 10.1 mg/dL    Bilirubin Total 0.5 0.2 - 1.3 mg/dL    Albumin 4.0 3.4 - 5.0 g/dL    Protein Total 7.8 6.8 - 8.8 g/dL    Alkaline Phosphatase 108 40 - 150 U/L    ALT 37 0 - 50 U/L    AST 34 0 - 45 U/L   Hemoglobin A1c   Result Value Ref Range    Hemoglobin A1C 6.6 (H) 4.3 - 6.0 %   Albumin Random  Urine Quantitative   Result Value Ref Range    Creatinine Urine 80 mg/dL    Albumin Urine mg/L 7 mg/L    Albumin Urine mg/g Cr 9.24 0 - 25 mg/g Cr   TSH with free T4 reflex   Result Value Ref Range    TSH 2.82 0.40 - 4.00 mU/L       If you have any questions or concerns, please call the clinic at the number listed above.       Sincerely,        Rachna Aden MD

## 2017-08-09 NOTE — PROGRESS NOTES
SUBJECTIVE:   CC: Lis Kevin is an 57 year old woman who presents for preventive health visit.     Healthy Habits:    Do you get at least three servings of calcium containing foods daily (dairy, green leafy vegetables, etc.)? yes    Amount of exercise or daily activities, outside of work: 7 day(s) per week    Problems taking medications regularly No    Medication side effects: No    Have you had an eye exam in the past two years? yes    Do you see a dentist twice per year? no    Do you have sleep apnea, excessive snoring or daytime drowsiness?no          PROBLEMS TO ADD ON...  Has left knee pain x 3 weeks , felt  worse last week. No recall of any acute injury , came on gradual, no swelling or redness etc, hurts to walk , bend etc . OTC pain med's help some . She has used knee support with some help         Diabetes Follow-up      Patient is checking blood sugars: rarely.  Results range from 120 or less     Diabetic concerns: None     Symptoms of hypoglycemia (low blood sugar): none     Paresthesias (numbness or burning in feet) or sores: No     Date of last diabetic eye exam: 07/2017    Hyperlipidemia Follow-Up      Rate your low fat/cholesterol diet?: good    Taking statin?  Yes, no muscle aches from statin    Other lipid medications/supplements?:  none            Today's PHQ-2 Score:   PHQ-2 ( 1999 Pfizer) 8/9/2017 5/1/2017   Q1: Little interest or pleasure in doing things 0 0   Q2: Feeling down, depressed or hopeless 0 0   PHQ-2 Score 0 0         Abuse: Current or Past(Physical, Sexual or Emotional)- No  Do you feel safe in your environment - Yes  Social History   Substance Use Topics     Smoking status: Never Smoker     Smokeless tobacco: Never Used     Alcohol use No     The patient does not drink >3 drinks per day nor >7 drinks per week.    Reviewed orders with patient.  Reviewed health maintenance and updated orders accordingly - Yes  Patient Active Problem List   Diagnosis     Abnormal weight gain      Dyspepsia and other specified disorders of function of stomach     Other acne     Calcific tendonitis     Abnormal LFTs     Post-proc states NEC     Dysmetabolic syndrome X     Hyperlipidemia LDL goal <100     Pain in joint, shoulder region     Dry eyes     Hyperplastic colon polyp     Serrated adenoma of colon     Backache     Gall stone     DM type 2 goal A1C below 7.5     Fatty liver     Type 2 diabetes mellitus without complication (H)     Obesity, Class II, BMI 35-39.9, with comorbidity     Symptomatic menopausal or female climacteric states     Bilateral thumb pain     Past Surgical History:   Procedure Laterality Date     C DEXA INTERPRETATION, AXIAL  8/10    normal bone mineral density      C NONSPECIFIC PROCEDURE      BLT     COLONOSCOPY  3/12    repeat in 2015       Social History   Substance Use Topics     Smoking status: Never Smoker     Smokeless tobacco: Never Used     Alcohol use No     Family History   Problem Relation Age of Onset     Hypertension Mother      DIABETES Sister      Hyperlipidemia No family hx of      Coronary Artery Disease No family hx of      Cancer - colorectal No family hx of      Breast Cancer No family hx of              Patient over age 50, mutual decision to screen reflected in health maintenance.      Pertinent mammograms are reviewed under the imaging tab.  History of abnormal Pap smear: NO - age 30- 65 PAP every 3 years recommended    Reviewed and updated as needed this visit by clinical staff         Reviewed and updated as needed this visit by Provider        Past Medical History:   Diagnosis Date     Dyspepsia and other specified disorders of function of stomach     pos.H.pylori tx'ed     Other and unspecified hyperlipidemia         ROS:  C: NEGATIVE for fever, chills, change in weight  I: NEGATIVE for worrisome rashes, moles or lesions  E: NEGATIVE for vision changes or irritation  ENT: NEGATIVE for ear, mouth and throat problems  R: NEGATIVE for significant cough or  SOB  B: NEGATIVE for masses, tenderness or discharge  CV: NEGATIVE for chest pain, palpitations or peripheral edema  GI: NEGATIVE for nausea, abdominal pain, heartburn, or change in bowel habits  : NEGATIVE for unusual urinary or vaginal symptoms. Periods are regular.  MUSCULOSKELETAL:as per HPI   N: NEGATIVE for weakness, dizziness or paresthesias  ENDOCRINE: as per HPI   H: NEGATIVE for bleeding problems  P: NEGATIVE for changes in mood or affect    OBJECTIVE:   LMP 06/10/2009  EXAM:  GENERAL APPEARANCE: healthy, alert and no distress  EYES: Eyes grossly normal to inspection, PERRL and conjunctivae and sclerae normal  HENT: ear canals and TM's normal, nose and mouth without ulcers or lesions, oropharynx clear and oral mucous membranes moist  NECK: no adenopathy, no asymmetry, masses, or scars and thyroid normal to palpation  RESP: lungs clear to auscultation - no rales, rhonchi or wheezes  BREAST: normal without masses, tenderness or nipple discharge and no palpable axillary masses or adenopathy  CV: regular rate and rhythm, normal S1 S2, no S3 or S4, no murmur, click or rub, no peripheral edema and peripheral pulses strong  ABDOMEN: soft, nontender, no hepatosplenomegaly, no masses and bowel sounds normal   (female): deferred  MS: no musculoskeletal defects are noted, no leg edema , left knee range of motion minimal tenderness along medial side of knee, but no joint line tenderness , knee joint is stable otherwise   SKIN: no suspicious lesions or rashes  NEURO: Normal strength and tone, sensory exam grossly normal, mentation intact and speech normal  PSYCH: mentation appears normal and affect normal/bright  Foot exam : No lesion , normal sensation by monofilament. Normal peripheral pulses  .     ASSESSMENT/PLAN:   (Z00.00) Routine general medical examination at a health care facility  (primary encounter diagnosis)  Comment:   Plan:     (E11.9) Type 2 diabetes mellitus without complication, without long-term  "current use of insulin (H)  Comment: last a1c was high   Plan: LIPID REFLEX TO DIRECT LDL PANEL, Comprehensive        metabolic panel, Hemoglobin A1c, Albumin Random        Urine Quantitative, TSH with free T4 reflex,         metFORMIN (GLUCOPHAGE) 500 MG tablet, blood         glucose monitoring (ACCU-CHEK SMARTVIEW) test         strip, FOOT EXAM         Discussed cares, diet/ exercise . Talked about DM management , health risk etc. gave refill on meds. Check lab, call pt with results. Follow up as needed      (E66.9) Obesity, Class II, BMI 35-39.9, with comorbidity  Comment:   Plan:       (K76.0) Fatty liver  Comment:   Plan: Comprehensive metabolic panel                  (E78.5) Hyperlipidemia LDL goal <100  Comment:   Plan: atorvastatin (LIPITOR) 10 MG tablet            (M25.562) Acute pain of left knee  Comment: media strain   Plan: nabumetone (RELAFEN) 500 MG tablet           discussed knee cares and symptomatic treatment including  adequate pain control, heat,  stretches etc.  she will do follow up if no improvement or problem. Consider further evaluation and  physical therapy if needed.         COUNSELING:   Reviewed preventive health counseling, as reflected in patient instructions       Regular exercise       Healthy diet/nutrition       Vision screening         reports that she has never smoked. She has never used smokeless tobacco.    Estimated body mass index is 36.36 kg/(m^2) as calculated from the following:    Height as of 5/1/17: 4' 11\" (1.499 m).    Weight as of 5/1/17: 180 lb (81.6 kg).   Weight management plan: Discussed healthy diet and exercise guidelines and patient will follow up in 12 months in clinic to re-evaluate.    Counseling Resources:  ATP IV Guidelines  Pooled Cohorts Equation Calculator  Breast Cancer Risk Calculator  FRAX Risk Assessment  ICSI Preventive Guidelines  Dietary Guidelines for Americans, 2010  USDA's MyPlate  ASA Prophylaxis  Lung CA Screening    Rachna Aden, " MD  [facility] [place]

## 2017-08-09 NOTE — NURSING NOTE
"Chief Complaint   Patient presents with     Physical       Initial /80  Pulse 57  Temp 97.1  F (36.2  C) (Tympanic)  Ht 4' 11\" (1.499 m)  Wt 177 lb (80.3 kg)  LMP 06/10/2009  SpO2 97%  BMI 35.75 kg/m2 Estimated body mass index is 35.75 kg/(m^2) as calculated from the following:    Height as of this encounter: 4' 11\" (1.499 m).    Weight as of this encounter: 177 lb (80.3 kg).  Medication Reconciliation: complete  "

## 2017-10-05 ENCOUNTER — TELEPHONE (OUTPATIENT)
Dept: FAMILY MEDICINE | Facility: CLINIC | Age: 57
End: 2017-10-05

## 2017-10-05 NOTE — TELEPHONE ENCOUNTER
EAS-2017 Preventative Care Verification Form dropped off by patient  Needs ASAP; today or tomorrow (Friday)  Form placed in MD bin  Call patient when ready [phone]  Mellissa MURDOCK

## 2018-02-08 ENCOUNTER — OFFICE VISIT (OUTPATIENT)
Dept: FAMILY MEDICINE | Facility: CLINIC | Age: 58
End: 2018-02-08
Payer: COMMERCIAL

## 2018-02-08 VITALS
SYSTOLIC BLOOD PRESSURE: 115 MMHG | BODY MASS INDEX: 35.48 KG/M2 | OXYGEN SATURATION: 96 % | WEIGHT: 176 LBS | TEMPERATURE: 97.2 F | DIASTOLIC BLOOD PRESSURE: 65 MMHG | HEIGHT: 59 IN | HEART RATE: 55 BPM

## 2018-02-08 DIAGNOSIS — R79.89 ABNORMAL LFTS: ICD-10-CM

## 2018-02-08 DIAGNOSIS — Z23 FLU VACCINE NEED: ICD-10-CM

## 2018-02-08 DIAGNOSIS — E11.9 TYPE 2 DIABETES MELLITUS WITHOUT COMPLICATION, WITHOUT LONG-TERM CURRENT USE OF INSULIN (H): Primary | ICD-10-CM

## 2018-02-08 DIAGNOSIS — Z23 NEED FOR PROPHYLACTIC VACCINATION AND INOCULATION AGAINST INFLUENZA: ICD-10-CM

## 2018-02-08 LAB — HBA1C MFR BLD: 6.7 % (ref 4.3–6)

## 2018-02-08 PROCEDURE — 99213 OFFICE O/P EST LOW 20 MIN: CPT | Mod: 25 | Performed by: FAMILY MEDICINE

## 2018-02-08 PROCEDURE — 36415 COLL VENOUS BLD VENIPUNCTURE: CPT | Performed by: FAMILY MEDICINE

## 2018-02-08 PROCEDURE — 83036 HEMOGLOBIN GLYCOSYLATED A1C: CPT | Performed by: FAMILY MEDICINE

## 2018-02-08 PROCEDURE — 90471 IMMUNIZATION ADMIN: CPT | Performed by: FAMILY MEDICINE

## 2018-02-08 PROCEDURE — 90686 IIV4 VACC NO PRSV 0.5 ML IM: CPT | Performed by: FAMILY MEDICINE

## 2018-02-08 NOTE — PROGRESS NOTES
SUBJECTIVE:   Lis Keivn is a 57 year old female who presents to clinic today for the following health issues:      Medication Followup of  Metformin, Atorvastatin,     Taking Medication as prescribed: yes    Side Effects:  None    Medication Helping Symptoms:  yes       PROBLEMS TO ADD ON...  Diabetes Follow-up      Patient is checking blood sugars: rarely.  Results range from 120, fasting  to 160, non fasting     Diabetic concerns: None     Symptoms of hypoglycemia (low blood sugar): none     Paresthesias (numbness or burning in feet) or sores: No     Date of last diabetic eye exam: within a year     BP Readings from Last 2 Encounters:   02/08/18 115/65   08/09/17 127/80     Hemoglobin A1C (%)   Date Value   08/09/2017 6.6 (H)   03/27/2017 8.4 (H)     LDL Cholesterol Calculated (mg/dL)   Date Value   08/09/2017 77   07/21/2016 82       Problem list and histories reviewed & adjusted, as indicated.  Additional history: as documented    Patient Active Problem List   Diagnosis     Abnormal weight gain     Dyspepsia and other specified disorders of function of stomach     Other acne     Calcific tendonitis     Abnormal LFTs     Other postprocedural status(V45.89)     Dysmetabolic syndrome X     Hyperlipidemia LDL goal <100     Pain in joint, shoulder region     Dry eyes     Hyperplastic colon polyp     Serrated adenoma of colon     Backache     Gall stone     DM type 2 goal A1C below 7.5     Fatty liver     Type 2 diabetes mellitus without complication (H)     Obesity, Class II, BMI 35-39.9, with comorbidity     Symptomatic menopausal or female climacteric states     Bilateral thumb pain     Type 2 diabetes mellitus without complication, without long-term current use of insulin (H)     Past Surgical History:   Procedure Laterality Date     C DEXA INTERPRETATION, AXIAL  8/10    normal bone mineral density      C NONSPECIFIC PROCEDURE      BLT     COLONOSCOPY  3/12    repeat in 2015       Social History   Substance  "Use Topics     Smoking status: Never Smoker     Smokeless tobacco: Never Used     Alcohol use No     Family History   Problem Relation Age of Onset     Hypertension Mother      DIABETES Sister      Hyperlipidemia No family hx of      Coronary Artery Disease No family hx of      Cancer - colorectal No family hx of      Breast Cancer No family hx of            Reviewed and updated as needed this visit by clinical staff       Reviewed and updated as needed this visit by Provider         ROS:  Constitutional, HEENT, cardiovascular, pulmonary, GI, , musculoskeletal, neuro, skin, endocrine and psych systems are negative, except as otherwise noted.    OBJECTIVE:     /65  Pulse 55  Temp 97.2  F (36.2  C) (Tympanic)  Ht 4' 11\" (1.499 m)  Wt 176 lb (79.8 kg)  LMP 06/10/2009  SpO2 96%  BMI 35.55 kg/m2  Body mass index is 35.55 kg/(m^2).  GENERAL: healthy, alert and no distress  NECK: no adenopathy  RESP: lungs clear to auscultation - no rales, rhonchi or wheezes  CV: regular rate and rhythm, normal S1 S2, no S3 or S4, no murmur, click or rub, no peripheral edema and peripheral pulses strong  ABDOMEN: soft, nontender, no hepatosplenomegaly, no masses and bowel sounds normal  MS: no edema        ASSESSMENT/PLAN:         (E11.9) Type 2 diabetes mellitus without complication, without long-term current use of insulin (H)  (primary encounter diagnosis)  Comment:   Plan: Hemoglobin A1c, metFORMIN (GLUCOPHAGE) 500 MG         tablet         Discussed cares, diet/ exercise . Talked about DM management , health risk etc. gave refill on meds. Check lab, call pt with results.. Follow up in 4-6 months , sooner if needed      (Z23) Flu vaccine need  Comment:   Plan:   vaccination  given, since patient  was willing to proceed. Cares discussed. Follow up as needed       Patient expressed understanding and agreement with treatment plan. All patient's questions were answered, will let me know if has more later.  Medications: Rx's: " Reviewed the potential side effects/complications of medications prescribed.       Rachna Aden MD  [facility]

## 2018-02-08 NOTE — MR AVS SNAPSHOT
"              After Visit Summary   2/8/2018    Lis Kevin    MRN: [id number]           Patient Information     Date Of Birth          1960        Visit Information        Provider Department      2/8/2018 9:30 AM Rachna Aden MD; [employer] [facility]        Today's Diagnoses     Type 2 diabetes mellitus without complication, without long-term current use of insulin (H)    -  1    Abnormal LFTs        Flu vaccine need          Care Instructions    Take medications as directed.  Treatment  and symptomatic cares discussed   Follow up if problem or concern             Follow-ups after your visit        Follow-up notes from your care team     Return in about 6 months (around 8/8/2018), or sooner if problem , for Physical Exam, Diabetes check.      Who to contact     If you have questions or need follow up information about today's clinic visit or your schedule please contact [facility] directly at [phone].  Normal or non-critical lab and imaging results will be communicated to you by MyChart, letter or phone within 4 business days after the clinic has received the results. If you do not hear from us within 7 days, please contact the clinic through [employer] or phone. If you have a critical or abnormal lab result, we will notify you by phone as soon as possible.  Submit refill requests through [employer] or call your pharmacy and they will forward the refill request to us. Please allow 3 business days for your refill to be completed.          Additional Information About Your Visit        [employer] information technology Information     [employer] lets you send messages to your doctor, view your test results, renew your prescriptions, schedule appointments and more. To sign up, go to [email or web address] . Click on \"Log in\" on the left side of the screen, which will take you to the Welcome page. Then click on \"Sign up Now\" on the right side of the page.     You will be " "asked to enter the access code listed below, as well as some personal information. Please follow the directions to create your username and password.     Your access code is: [email or web address]  Expires: 2018 10:09 AM     Your access code will  in 90 days. If you need help or a new code, please call your [place] clinic or [phone].        Care EveryWhere ID     This is your Care EveryWhere ID. This could be used by other organizations to access your [place] medical records  [id number]        Your Vitals Were     Pulse Temperature Height Last Period Pulse Oximetry BMI (Body Mass Index)    55 97.2  F (36.2  C) (Tympanic) 4' 11\" (1.499 m) 06/10/2009 96% 35.55 kg/m2       Blood Pressure from Last 3 Encounters:   18 115/65   17 127/80   17 112/64    Weight from Last 3 Encounters:   18 176 lb (79.8 kg)   17 177 lb (80.3 kg)   17 180 lb (81.6 kg)              We Performed the Following     Hemoglobin A1c          Where to get your medicines      These medications were sent to [id number] - [place], [place] - [address] AT [facility] [place] [address]  [address], [place] [address]     Phone:  [phone]     metFORMIN 500 MG tablet          Primary Care Provider Office Phone # Fax #    Rachna Aden [phone]       [address]  [place] [address]        Equal Access to Services     [facility] : Hadii davide burch Sodenys, waaxda luqadaha, qaybta kaalmakristin lopez. So [facility] [phone].    ATENCIÓN: Si habla español, tiene a crum disposición servicios gratuitos de asistencia lingüística. Pierce al [phone].    We comply with applicable federal civil rights laws and [place] laws. We do not discriminate on the basis of race, color, national origin, age, disability, sex, sexual orientation, or gender identity.            Thank you!     Thank you for " choosing [facility] [place]  for your care. Our goal is always to provide you with excellent care. Hearing back from our patients is one way we can continue to improve our services. Please take a few minutes to complete the written survey that you may receive in the mail after your visit with us. Thank you!             Your Updated Medication List - Protect others around you: Learn how to safely use, store and throw away your medicines at www.disposemymeds.org.          This list is accurate as of 2/8/18 10:09 AM.  Always use your most recent med list.                   Brand Name Dispense Instructions for use Diagnosis    ACE NOT PRESCRIBED (INTENTIONAL)     0 each    ACE Inhibitor not prescribed due to Other:not needed    Type 2 diabetes mellitus without complication (H)       aspirin 81 MG EC tablet     90 tablet    Take 1 tablet (81 mg) by mouth daily    Type 2 diabetes mellitus without complication (H)       atorvastatin 10 MG tablet    LIPITOR    90 tablet    Take 1 tablet (10 mg) by mouth daily    Hyperlipidemia LDL goal <100       blood glucose monitoring test strip    ACCU-CHEK SMARTVIEW    100 strip    Test 1-2  times a day    Type 2 diabetes mellitus without complication, without long-term current use of insulin (H)       metFORMIN 500 MG tablet    GLUCOPHAGE    180 tablet    Take 2 tablets (1,000 mg) by mouth 2 times daily (with meals)    Type 2 diabetes mellitus without complication, without long-term current use of insulin (H)       nabumetone 500 MG tablet    RELAFEN    30 tablet    Take 1-2 tablets (500-1,000 mg) by mouth 2 times daily as needed for moderate pain    Acute pain of left knee       vitamin B complex with vitamin C Tabs tablet      Take 1 tablet by mouth daily        vitamin D 2000 UNITS tablet      Take 1 tablet by mouth daily.

## 2018-02-08 NOTE — LETTER
February 13, 2018      Lis SPENCE Herberth  [address]  [place] [address]        Dear MsAlyseHerberth,    We are writing to inform you of your test results.    Hemoglobin A1C ( DM test)  Looks good . You can continue to manage your DM with same medication dose, diet control and exercise. You should do retest in 4-6 months     Resulted Orders   Hemoglobin A1c   Result Value Ref Range    Hemoglobin A1C 6.7 (H) 4.3 - 6.0 %       If you have any questions or concerns, please call the clinic at the number listed above.       Sincerely,        Rachna Aden MD

## 2018-09-13 ENCOUNTER — OFFICE VISIT (OUTPATIENT)
Dept: FAMILY MEDICINE | Facility: CLINIC | Age: 58
End: 2018-09-13
Payer: COMMERCIAL

## 2018-09-13 VITALS
OXYGEN SATURATION: 98 % | WEIGHT: 179 LBS | BODY MASS INDEX: 36.08 KG/M2 | TEMPERATURE: 97.6 F | HEIGHT: 59 IN | SYSTOLIC BLOOD PRESSURE: 113 MMHG | DIASTOLIC BLOOD PRESSURE: 73 MMHG | HEART RATE: 61 BPM

## 2018-09-13 DIAGNOSIS — Z00.00 ROUTINE GENERAL MEDICAL EXAMINATION AT A HEALTH CARE FACILITY: Primary | ICD-10-CM

## 2018-09-13 DIAGNOSIS — K76.0 FATTY LIVER: ICD-10-CM

## 2018-09-13 DIAGNOSIS — Z12.39 BREAST CANCER SCREENING: ICD-10-CM

## 2018-09-13 DIAGNOSIS — K21.9 GASTROESOPHAGEAL REFLUX DISEASE, ESOPHAGITIS PRESENCE NOT SPECIFIED: ICD-10-CM

## 2018-09-13 DIAGNOSIS — Z13.9 SCREENING FOR CONDITION: ICD-10-CM

## 2018-09-13 DIAGNOSIS — E11.9 TYPE 2 DIABETES MELLITUS WITHOUT COMPLICATION, WITHOUT LONG-TERM CURRENT USE OF INSULIN (H): ICD-10-CM

## 2018-09-13 DIAGNOSIS — E78.5 HYPERLIPIDEMIA LDL GOAL <100: ICD-10-CM

## 2018-09-13 DIAGNOSIS — R05.9 COUGH: ICD-10-CM

## 2018-09-13 LAB — HBA1C MFR BLD: 7.3 % (ref 0–5.6)

## 2018-09-13 PROCEDURE — 87389 HIV-1 AG W/HIV-1&-2 AB AG IA: CPT | Performed by: FAMILY MEDICINE

## 2018-09-13 PROCEDURE — 82043 UR ALBUMIN QUANTITATIVE: CPT | Performed by: FAMILY MEDICINE

## 2018-09-13 PROCEDURE — 80053 COMPREHEN METABOLIC PANEL: CPT | Performed by: FAMILY MEDICINE

## 2018-09-13 PROCEDURE — 99396 PREV VISIT EST AGE 40-64: CPT | Performed by: FAMILY MEDICINE

## 2018-09-13 PROCEDURE — 99213 OFFICE O/P EST LOW 20 MIN: CPT | Mod: 25 | Performed by: FAMILY MEDICINE

## 2018-09-13 PROCEDURE — 99207 C FOOT EXAM  NO CHARGE: CPT | Mod: 25 | Performed by: FAMILY MEDICINE

## 2018-09-13 PROCEDURE — 83036 HEMOGLOBIN GLYCOSYLATED A1C: CPT | Performed by: FAMILY MEDICINE

## 2018-09-13 PROCEDURE — 36415 COLL VENOUS BLD VENIPUNCTURE: CPT | Performed by: FAMILY MEDICINE

## 2018-09-13 PROCEDURE — 84443 ASSAY THYROID STIM HORMONE: CPT | Performed by: FAMILY MEDICINE

## 2018-09-13 PROCEDURE — 80061 LIPID PANEL: CPT | Performed by: FAMILY MEDICINE

## 2018-09-13 RX ORDER — ATORVASTATIN CALCIUM 10 MG/1
10 TABLET, FILM COATED ORAL DAILY
Qty: 90 TABLET | Refills: 1 | Status: SHIPPED | OUTPATIENT
Start: 2018-09-13 | End: 2018-12-06

## 2018-09-13 NOTE — MR AVS SNAPSHOT
After Visit Summary   9/13/2018    Lis Kevin    MRN: [id number]           Patient Information     Date Of Birth          1960        Visit Information        Provider Department      9/13/2018 10:30 AM Rachna Aden MD; PHONE,  [facility]aire        Today's Diagnoses     Routine general medical examination at a health care facility    -  1    Hyperlipidemia LDL goal <100        Type 2 diabetes mellitus without complication, without long-term current use of insulin (H)        Fatty liver        Gastroesophageal reflux disease, esophagitis presence not specified        Screening for condition        Cough        Breast cancer screening          Care Instructions      Preventive Health Recommendations  Female Ages 50 - 64    Yearly exam: See your health care provider every year in order to  o Review health changes.   o Discuss preventive care.    o Review your medicines if your doctor has prescribed any.      Get a Pap test every three years (unless you have an abnormal result and your provider advises testing more often).    If you get Pap tests with HPV test, you only need to test every 5 years, unless you have an abnormal result.     You do not need a Pap test if your uterus was removed (hysterectomy) and you have not had cancer.    You should be tested each year for STDs (sexually transmitted diseases) if you're at risk.     Have a mammogram every 1 to 2 years.    Have a colonoscopy at age 50, or have a yearly FIT test (stool test). These exams screen for colon cancer.      Have a cholesterol test every 5 years, or more often if advised.    Have a diabetes test (fasting glucose) every three years. If you are at risk for diabetes, you should have this test more often.     If you are at risk for osteoporosis (brittle bone disease), think about having a bone density scan (DEXA).    Shots: Get a flu shot each year. Get a tetanus shot every 10 years.    Nutrition:      Eat at least 5 servings of fruits and vegetables each day.    Eat whole-grain bread, whole-wheat pasta and brown rice instead of white grains and rice.    Get adequate Calcium and Vitamin D.     Lifestyle    Exercise at least 150 minutes a week (30 minutes a day, 5 days a week). This will help you control your weight and prevent disease.    Limit alcohol to one drink per day.    No smoking.     Wear sunscreen to prevent skin cancer.     See your dentist every six months for an exam and cleaning.    See your eye doctor every 1 to 2 years.            Follow-ups after your visit        Follow-up notes from your care team     Return in about 4 weeks (around 10/11/2018), or if symptoms worsen or fail to improve.      Your next 10 appointments already scheduled     Sep 19, 2018 10:45 AM CDT   MA SCREENING DIGITAL BILATERAL with ECMA1   [facility]    [address] [place] [id number] [phone]           Do not use any powder, lotion or deodorant under your arms or on your breast. If you do, we will ask you to remove it before your exam.  Wear comfortable, two-piece clothing.  If you have any allergies, tell your care team.  Bring any previous mammograms from other facilities or have them mailed to the breast center.              Future tests that were ordered for you today     Open Future Orders        Priority Expected Expires Ordered    MA Screening Digital Bilateral Routine  9/13/2019 9/13/2018            Who to contact     If you have questions or need follow up information about today's clinic visit or your schedule please contact [facility] directly at [phone].  Normal or non-critical lab and imaging results will be communicated to you by MyChart, letter or phone within 4 business days after the clinic has received the results. If you do not hear from us within 7 days, please contact the clinic through [employer] or  "phone. If you have a critical or abnormal lab result, we will notify you by phone as soon as possible.  Submit refill requests through [employer] or call your pharmacy and they will forward the refill request to us. Please allow 3 business days for your refill to be completed.          Additional Information About Your Visit        Care EveryWhere ID     This is your Care EveryWhere ID. This could be used by other organizations to access your [place] medical records  [id number]        Your Vitals Were     Pulse Temperature Height Last Period Pulse Oximetry BMI (Body Mass Index)    61 97.6  F (36.4  C) (Tympanic) 4' 11\" (1.499 m) 06/10/2009 98% 36.15 kg/m2       Blood Pressure from Last 3 Encounters:   09/13/18 113/73   02/08/18 115/65   08/09/17 127/80    Weight from Last 3 Encounters:   09/13/18 179 lb (81.2 kg)   02/08/18 176 lb (79.8 kg)   08/09/17 177 lb (80.3 kg)              We Performed the Following     Albumin Random Urine Quantitative with Creat Ratio     Comprehensive metabolic panel     FOOT EXAM     HEMOGLOBIN A1C     HIV Antigen Antibody Combo     Lipid panel reflex to direct LDL Fasting     TSH with free T4 reflex          Today's Medication Changes          These changes are accurate as of 9/13/18 11:39 AM.  If you have any questions, ask your nurse or doctor.               Start taking these medicines.        Dose/Directions    omeprazole 20 MG CR capsule   Commonly known as:  priLOSEC   Used for:  Gastroesophageal reflux disease, esophagitis presence not specified, Cough   Started by:  Rachna Aden MD        Dose:  20 mg   Take 1 capsule (20 mg) by mouth daily   Quantity:  90 capsule   Refills:  3         These medicines have changed or have updated prescriptions.        Dose/Directions    * atorvastatin 10 MG tablet   Commonly known as:  LIPITOR   This may have changed:  Another medication with the same name was added. Make sure you understand how and when to take each.   Used for:  " Hyperlipidemia LDL goal <100   Changed by:  Rachna Aden MD        Dose:  10 mg   Take 1 tablet (10 mg) by mouth daily   Quantity:  90 tablet   Refills:  1       * atorvastatin 10 MG tablet   Commonly known as:  LIPITOR   This may have changed:  You were already taking a medication with the same name, and this prescription was added. Make sure you understand how and when to take each.   Used for:  Hyperlipidemia LDL goal <100, Type 2 diabetes mellitus without complication, without long-term current use of insulin (H), Fatty liver   Changed by:  Rachna Aden MD        Dose:  10 mg   Take 1 tablet (10 mg) by mouth daily   Quantity:  90 tablet   Refills:  1       * Notice:  This list has 2 medication(s) that are the same as other medications prescribed for you. Read the directions carefully, and ask your doctor or other care provider to review them with you.         Where to get your medicines      These medications were sent to [id number] - [place], [place] - [address] [place] [address]  [address], [place] [address]    Hours:  24-hours Phone:  [phone]     aspirin 81 MG EC tablet    atorvastatin 10 MG tablet    metFORMIN 500 MG tablet    omeprazole 20 MG CR capsule                Primary Care Provider Office Phone # Fax #    Rachna Aden [phone] [address]  [place] [address]        Equal Access to Services     [facility] AH: Hadii aad ku hadasho Soomaali, waaxda luqadaha, qaybta kaalmada adeegyada, waxay eligio haychioma sosa. So [facility] [phone].    ATENCIÓN: Si habla español, tiene a crum disposición servicios gratuitos de asistencia lingüística. Llame al [phone].    We comply with applicable federal civil rights laws and [place] laws. We do not discriminate on the basis of race, color, national origin, age, disability, sex, sexual orientation, or gender identity.             Thank you!     Thank you for choosing [facility] [place]  for your care. Our goal is always to provide you with excellent care. Hearing back from our patients is one way we can continue to improve our services. Please take a few minutes to complete the written survey that you may receive in the mail after your visit with us. Thank you!             Your Updated Medication List - Protect others around you: Learn how to safely use, store and throw away your medicines at www.disposemymeds.org.          This list is accurate as of 9/13/18 11:39 AM.  Always use your most recent med list.                   Brand Name Dispense Instructions for use Diagnosis    ACE NOT PRESCRIBED (INTENTIONAL)     0 each    ACE Inhibitor not prescribed due to Other:not needed    Type 2 diabetes mellitus without complication (H)       aspirin 81 MG EC tablet     90 tablet    Take 1 tablet (81 mg) by mouth daily    Type 2 diabetes mellitus without complication, without long-term current use of insulin (H)       * atorvastatin 10 MG tablet    LIPITOR    90 tablet    Take 1 tablet (10 mg) by mouth daily    Hyperlipidemia LDL goal <100       * atorvastatin 10 MG tablet    LIPITOR    90 tablet    Take 1 tablet (10 mg) by mouth daily    Hyperlipidemia LDL goal <100, Type 2 diabetes mellitus without complication, without long-term current use of insulin (H), Fatty liver       blood glucose monitoring test strip    ACCU-CHEK SMARTVIEW    100 strip    Test 1-2  times a day    Type 2 diabetes mellitus without complication, without long-term current use of insulin (H)       metFORMIN 500 MG tablet    GLUCOPHAGE    180 tablet    Take 2 tablets (1,000 mg) by mouth 2 times daily (with meals)    Type 2 diabetes mellitus without complication, without long-term current use of insulin (H)       omeprazole 20 MG CR capsule    priLOSEC    90 capsule    Take 1 capsule (20 mg) by mouth daily    Gastroesophageal reflux disease, esophagitis presence not  specified, Cough       vitamin B complex with vitamin C Tabs tablet      Take 1 tablet by mouth daily        vitamin D 2000 units tablet      Take 1 tablet by mouth daily.        * Notice:  This list has 2 medication(s) that are the same as other medications prescribed for you. Read the directions carefully, and ask your doctor or other care provider to review them with you.

## 2018-09-13 NOTE — PROGRESS NOTES
SUBJECTIVE:   CC: Lis Kevin is an 58 year old woman who presents for preventive health visit.     Healthy Habits:    Do you get at least three servings of calcium containing foods daily (dairy, green leafy vegetables, etc.)? yes    Amount of exercise or daily activities, outside of work: 2 day(s) per week    Problems taking medications regularly No     Medication side effects: No    Have you had an eye exam in the past two years? yes    Do you see a dentist twice per year? yes    Do you have sleep apnea, excessive snoring or daytime drowsiness?no      PROBLEMS TO ADD ON...  GERD/Heartburn  Duration of complaint:  Has been having indigestion , heart burn again lately also has some cough, jose antonio a chest tightness feeling some times no grading pain  ain, sob wheezing or other uri sx otherwise. Had used Prilosec in the past to help ,  but not taking any more   Description of symptoms:    food getting stuck: no   nausea/vomiting: no   abdominal pain: no   black/tarry or bloody stools: no :  worse with spicy foods and caffeinated drinks.  current NSAID/Aspirin use: no   Therapies tried and outcome:  None, but has taken Omeprazole (Prilosec) previously     Diabetes Follow-up      Patient is checking blood sugars: rarely.  Results range from 120  Am     Diabetic concerns: None and other - due for labs and need refill on med's      Symptoms of hypoglycemia (low blood sugar): none     Paresthesias (numbness or burning in feet) or sores: No     Date of last diabetic eye exam: last year     Diabetes Management Resources    Hyperlipidemia Follow-Up      Rate your low fat/cholesterol diet?: good    Taking statin?  Yes, no muscle aches from statin    Other lipid medications/supplements?:  none    Hypertension Follow-up      Outpatient blood pressures are being checked at home.  Results are good .    Low Salt Diet: no added salt    BP Readings from Last 2 Encounters:   09/13/18 113/73   02/08/18 115/65     Hemoglobin A1C (%)    Date Value   02/08/2018 6.7 (H)   08/09/2017 6.6 (H)     LDL Cholesterol Calculated (mg/dL)   Date Value   08/09/2017 77   07/21/2016 82       Today's PHQ-2 Score:   PHQ-2 ( 1999 [employer]) 9/13/2018 8/9/2017   Q1: Little interest or pleasure in doing things 0 0   Q2: Feeling down, depressed or hopeless 0 0   PHQ-2 Score 0 0       Abuse: Current or Past(Physical, Sexual or Emotional)- No  Do you feel safe in your environment - Yes    Social History   Substance Use Topics     Smoking status: Never Smoker     Smokeless tobacco: Never Used     Alcohol use No     If you drink alcohol do you typically have >3 drinks per day or >7 drinks per week? No                     Reviewed orders with patient.  Reviewed health maintenance and updated orders accordingly - Yes  Patient Active Problem List   Diagnosis     Abnormal weight gain     Dyspepsia and other specified disorders of function of stomach     Other acne     Calcific tendonitis     Abnormal LFTs     Other postprocedural status(V45.89)     Dysmetabolic syndrome X     Hyperlipidemia LDL goal <100     Pain in joint, shoulder region     Dry eyes     Hyperplastic colon polyp     Serrated adenoma of colon     Backache     Gall stone     DM type 2 goal A1C below 7.5     Fatty liver     Type 2 diabetes mellitus without complication (H)     Obesity, Class II, BMI 35-39.9, with comorbidity     Symptomatic menopausal or female climacteric states     Bilateral thumb pain     Type 2 diabetes mellitus without complication, without long-term current use of insulin (H)     Past Surgical History:   Procedure Laterality Date     C DEXA INTERPRETATION, AXIAL  8/10    normal bone mineral density      C NONSPECIFIC PROCEDURE      BLT     COLONOSCOPY  3/12    repeat in 2015       Social History   Substance Use Topics     Smoking status: Never Smoker     Smokeless tobacco: Never Used     Alcohol use No     Family History   Problem Relation Age of Onset     Hypertension Mother      Diabetes  Sister      Hyperlipidemia No family hx of      Coronary Artery Disease No family hx of      Cancer - colorectal No family hx of      Breast Cancer No family hx of            Patient over age 50, mutual decision to screen reflected in health maintenance.    Pertinent mammograms are reviewed under the imaging tab.  History of abnormal Pap smear: NO - age 30- 65 PAP every 3 years recommended  PAP / HPV 6/7/2016 4/28/2014 2/14/2013   PAP NIL NIL ASC-US(A)     Reviewed and updated as needed this visit by clinical staff         Reviewed and updated as needed this visit by Provider        Past Medical History:   Diagnosis Date     Dyspepsia and other specified disorders of function of stomach     pos.H.pylori tx'ed     Other and unspecified hyperlipidemia         ROS:  CONSTITUTIONAL: NEGATIVE for fever, chills, change in weight  INTEGUMENTARY/SKIN: NEGATIVE for worrisome rashes, moles or lesions  EYES: NEGATIVE for vision changes or irritation  ENT: NEGATIVE for ear, mouth and throat problems and except cough and throat irritation as per hpi  RESP: NEGATIVE for significant cough or SOB  BREAST: NEGATIVE for masses, tenderness or discharge  CV: NEGATIVE for chest pain, palpitations or peripheral edema  GI: NEGATIVE for nausea, abdominal pain, heartburn, or change in bowel habits  : NEGATIVE for unusual urinary or vaginal symptoms. No vaginal bleeding.  MUSCULOSKELETAL: NEGATIVE for significant arthralgias or myalgia  NEURO: NEGATIVE for weakness, dizziness or paresthesias  ENDOCRINE: NEGATIVE for temperature intolerance, skin/hair changes  HEME/ALLERGY/IMMUNE: NEGATIVE for bleeding problems  PSYCHIATRIC: NEGATIVE for changes in mood or affect     OBJECTIVE:   LMP 06/10/2009  EXAM:  GENERAL: healthy, alert and no distress  EYES: Eyes grossly normal to inspection, PERRL and conjunctivae and sclerae normal  HENT: ear canals and TM's normal, nose and mouth without ulcers or lesions  NECK: no adenopathy, no asymmetry,  masses, or scars and thyroid normal to palpation  RESP: lungs clear to auscultation - no rales, rhonchi or wheezes  BREAST: normal without masses, tenderness or nipple discharge and no palpable axillary masses or adenopathy  CV: regular rate and rhythm, normal S1 S2, no S3 or S4, no murmur, click or rub, no peripheral edema  ABDOMEN: soft, nontender, no hepatosplenomegaly, no masses and bowel sounds normal   (female): deferred  RECTAL: deferred  MS: no gross musculoskeletal defects noted, no edema  SKIN: no suspicious lesions or rashes  NEURO: Normal strength and tone, mentation intact and speech normal  PSYCH: mentation appears normal, affect normal/bright  Diabetic foot exam: normal DP and PT pulses, no trophic changes or ulcerative lesions and normal sensory exam        ASSESSMENT/PLAN:   (Z00.00) Routine general medical examination at a health care facility  (primary encounter diagnosis)  Comment:   Plan:     (E78.5) Hyperlipidemia LDL goal <100  Comment:   Plan: atorvastatin (LIPITOR) 10 MG tablet            (E11.9) Type 2 diabetes mellitus without complication, without long-term current use of insulin (H)  Comment:   Plan: Lipid panel reflex to direct LDL Fasting,         HEMOGLOBIN A1C, Albumin Random Urine         Quantitative with Creat Ratio, atorvastatin         (LIPITOR) 10 MG tablet, Comprehensive metabolic        panel, TSH with free T4 reflex, metFORMIN         (GLUCOPHAGE) 500 MG tablet, FOOT EXAM, aspirin         81 MG EC tablet                  Discussed cares, diet/ exercise . Talked about DM management , health risk etc. gave refill on meds. Check lab, call pt with results. Follow up  In 4-6 months, sooner as needed        (K76.0) Fatty liver  Comment:   Plan: atorvastatin (LIPITOR) 10 MG tablet,         Comprehensive metabolic panel          (R05) Cough  Comment: likely GERD related   Plan: omeprazole (PRILOSEC) 20 MG CR capsule      (K21.9) Gastroesophageal reflux disease, esophagitis  "presence not specified  Comment:   Plan: omeprazole (PRILOSEC) 20 MG CR capsule            Discussed possible differential diagnosis for her symptoms. Sounds like GERD, also talked about H pylori and other causes of recurrent heart burn,   also she is willing to try Prilosec 20 mg to see if helps with sx talked about reflux precautions etc . Suggest follow up in 4-6 week, sooner if problem. Consider GI referral and further evaluation if needed.       (Z13.9) Screening for condition  Comment:   Plan: HIV Antigen Antibody Combo      (E66.9) Obesity, Class II, BMI 35-39.9, with comorbidity  Comment:   Plan: Healthy diet and exercise reviewed.  Risks of obesity discussed.  Encourage exercise.                Check labs. refill sent.Cares and  treatment discussed follow. up if problem   Patient expressed understanding and agreement with treatment plan. All patient's questions were answered, will let me know if has more later.  Medications: Rx's: Reviewed the potential side effects/complications of medications prescribed.       COUNSELING:   Reviewed preventive health counseling, as reflected in patient instructions       Regular exercise       Healthy diet/nutrition       Vision screening       Hearing screening       Immunizations    Declined: Influenza and  will return another time     Also will consider Zoster ,            Osteoporosis Prevention/Bone Health       HIV screeninx in teen years, 1x in adult years, and at intervals if high risk    BP Readings from Last 1 Encounters:   18 115/65     Estimated body mass index is 35.55 kg/(m^2) as calculated from the following:    Height as of 18: 4' 11\" (1.499 m).    Weight as of 18: 176 lb (79.8 kg).      Weight management plan: Discussed healthy diet and exercise guidelines and patient will follow up in 6 months in clinic to re-evaluate.     reports that she has never smoked. She has never used smokeless tobacco.      Counseling Resources:  ATP IV " Guidelines  Pooled Cohorts Equation Calculator  Breast Cancer Risk Calculator  FRAX Risk Assessment  ICSI Preventive Guidelines  Dietary Guidelines for Americans, 2010  2C2P's MyPlate  ASA Prophylaxis  Lung CA Screening    Rachna Aden MD  [facility]

## 2018-09-13 NOTE — LETTER
September 17, 2018      Lis Kevin  [address]  [place] [address]        Dear ,    We are writing to inform you of your test results.    Normal urine micro albumin (diabetes test for kidneys)   Normal thyroid ( TSH)   Negative test for HIV   Lipid mildly elevated but stable and acceptable range . You can stay on same medication dose. Also need to watch diet( low fat, low cholesterol, high fiber diet) exercise. May also take omega-3 fatty acid( fish oil or flex seed oil capsule OTC) to help improve lipid. Follow up retest in one year     Normal  liver function tests, kidney functions,  and  electrolytes(various salts in your body)       Resulted Orders   Lipid panel reflex to direct LDL Fasting   Result Value Ref Range    Cholesterol 178 <200 mg/dL    Triglycerides 181 (H) <150 mg/dL      Comment:      Borderline high:  150-199 mg/dl  High:             200-499 mg/dl  Very high:       >499 mg/dl  Fasting specimen      HDL Cholesterol 47 (L) >49 mg/dL    LDL Cholesterol Calculated 95 <100 mg/dL      Comment:      Desirable:       <100 mg/dl    Non HDL Cholesterol 131 (H) <130 mg/dL      Comment:      Above Desirable:  130-159 mg/dl  Borderline high:  160-189 mg/dl  High:             190-219 mg/dl  Very high:       >219 mg/dl     HEMOGLOBIN A1C   Result Value Ref Range    Hemoglobin A1C 7.3 (H) 0 - 5.6 %      Comment:      Normal <5.7% Prediabetes 5.7-6.4%  Diabetes 6.5% or higher - adopted from ADA   consensus guidelines.     Albumin Random Urine Quantitative with Creat Ratio   Result Value Ref Range    Creatinine Urine 106 mg/dL    Albumin Urine mg/L 12 mg/L    Albumin Urine mg/g Cr 10.85 0 - 25 mg/g Cr   Comprehensive metabolic panel   Result Value Ref Range    Sodium 140 133 - 144 mmol/L    Potassium 4.1 3.4 - 5.3 mmol/L    Chloride 105 94 - 109 mmol/L    Carbon Dioxide 27 20 - 32 mmol/L    Anion Gap 8 3 - 14 mmol/L    Glucose 131 (H) 70 - 99 mg/dL      Comment:      Fasting specimen     Urea Nitrogen 12 7 - 30 mg/dL    Creatinine 0.64 0.52 - 1.04 mg/dL    GFR Estimate >90 >60 mL/min/1.7m2      Comment:      Non  GFR Calc    GFR Estimate If Black >90 >60 mL/min/1.7m2      Comment:       GFR Calc    Calcium 8.6 8.5 - 10.1 mg/dL    Bilirubin Total 0.8 0.2 - 1.3 mg/dL    Albumin 3.9 3.4 - 5.0 g/dL    Protein Total 7.7 6.8 - 8.8 g/dL    Alkaline Phosphatase 85 40 - 150 U/L    ALT 45 0 - 50 U/L    AST 42 0 - 45 U/L   TSH with free T4 reflex   Result Value Ref Range    TSH 2.08 0.40 - 4.00 mU/L   HIV Antigen Antibody Combo   Result Value Ref Range    HIV Antigen Antibody Combo Nonreactive NR^Nonreactive          Comment:      HIV-1 p24 Ag & HIV-1/HIV-2 Ab Not Detected       If you have any questions or concerns, please call the clinic at the number listed above.       Sincerely,        Rachna Aden MD

## 2018-09-13 NOTE — LETTER
September 14, 2018      Lis Kevin  [address]  [place] [address]        Dear ,        Your Hemoglobin A1C, diabetes test, looks ok.  It is slightly higher than previous but acceptable.  Need to monitor more closely.  You should also continue to manage your blood sugars with diet control and exercise.   You can  stay on same medication dose, and do test again in 4-6 months.          Resulted Orders   HEMOGLOBIN A1C   Result Value Ref Range    Hemoglobin A1C 7.3 (H) 0 - 5.6 %      Comment:      Normal <5.7% Prediabetes 5.7-6.4%  Diabetes 6.5% or higher - adopted from ADA   consensus guidelines.         If you have any questions or concerns, please call the clinic at the number listed above.       Sincerely,    Rachna Aden MD

## 2018-09-14 LAB
ALBUMIN SERPL-MCNC: 3.9 G/DL (ref 3.4–5)
ALP SERPL-CCNC: 85 U/L (ref 40–150)
ALT SERPL W P-5'-P-CCNC: 45 U/L (ref 0–50)
ANION GAP SERPL CALCULATED.3IONS-SCNC: 8 MMOL/L (ref 3–14)
AST SERPL W P-5'-P-CCNC: 42 U/L (ref 0–45)
BILIRUB SERPL-MCNC: 0.8 MG/DL (ref 0.2–1.3)
BUN SERPL-MCNC: 12 MG/DL (ref 7–30)
CALCIUM SERPL-MCNC: 8.6 MG/DL (ref 8.5–10.1)
CHLORIDE SERPL-SCNC: 105 MMOL/L (ref 94–109)
CHOLEST SERPL-MCNC: 178 MG/DL
CO2 SERPL-SCNC: 27 MMOL/L (ref 20–32)
CREAT SERPL-MCNC: 0.64 MG/DL (ref 0.52–1.04)
CREAT UR-MCNC: 106 MG/DL
GFR SERPL CREATININE-BSD FRML MDRD: >90 ML/MIN/1.7M2
GLUCOSE SERPL-MCNC: 131 MG/DL (ref 70–99)
HDLC SERPL-MCNC: 47 MG/DL
HIV 1+2 AB+HIV1 P24 AG SERPL QL IA: NONREACTIVE
LDLC SERPL CALC-MCNC: 95 MG/DL
MICROALBUMIN UR-MCNC: 12 MG/L
MICROALBUMIN/CREAT UR: 10.85 MG/G CR (ref 0–25)
NONHDLC SERPL-MCNC: 131 MG/DL
POTASSIUM SERPL-SCNC: 4.1 MMOL/L (ref 3.4–5.3)
PROT SERPL-MCNC: 7.7 G/DL (ref 6.8–8.8)
SODIUM SERPL-SCNC: 140 MMOL/L (ref 133–144)
TRIGL SERPL-MCNC: 181 MG/DL
TSH SERPL DL<=0.005 MIU/L-ACNC: 2.08 MU/L (ref 0.4–4)

## 2018-11-14 ENCOUNTER — ALLIED HEALTH/NURSE VISIT (OUTPATIENT)
Dept: NURSING | Facility: CLINIC | Age: 58
End: 2018-11-14
Payer: COMMERCIAL

## 2018-11-14 ENCOUNTER — RADIANT APPOINTMENT (OUTPATIENT)
Dept: MAMMOGRAPHY | Facility: CLINIC | Age: 58
End: 2018-11-14
Attending: FAMILY MEDICINE
Payer: COMMERCIAL

## 2018-11-14 DIAGNOSIS — Z23 NEED FOR PROPHYLACTIC VACCINATION AND INOCULATION AGAINST INFLUENZA: Primary | ICD-10-CM

## 2018-11-14 DIAGNOSIS — Z12.31 VISIT FOR SCREENING MAMMOGRAM: ICD-10-CM

## 2018-11-14 PROCEDURE — 77067 SCR MAMMO BI INCL CAD: CPT | Mod: TC

## 2018-11-14 PROCEDURE — 90686 IIV4 VACC NO PRSV 0.5 ML IM: CPT

## 2018-11-14 PROCEDURE — 90471 IMMUNIZATION ADMIN: CPT

## 2018-11-14 PROCEDURE — 99207 ZZC NO CHARGE NURSE ONLY: CPT

## 2018-11-14 NOTE — MR AVS SNAPSHOT
After Visit Summary   11/14/2018    Lis Kevin    MRN: [id number]           Patient Information     Date Of Birth          1960        Visit Information        Provider Department      11/14/2018 11:15 AM AZALEA WILLIS/LPN [facility] [place]        Today's Diagnoses     Need for prophylactic vaccination and inoculation against influenza    -  1       Follow-ups after your visit        Who to contact     If you have questions or need follow up information about today's clinic visit or your schedule please contact [facility] [place] directly at [phone].  Normal or non-critical lab and imaging results will be communicated to you by MyChart, letter or phone within 4 business days after the clinic has received the results. If you do not hear from us within 7 days, please contact the clinic through MyChart or phone. If you have a critical or abnormal lab result, we will notify you by phone as soon as possible.  Submit refill requests through [employer] or call your pharmacy and they will forward the refill request to us. Please allow 3 business days for your refill to be completed.          Additional Information About Your Visit        Care EveryWhere ID     This is your Care EveryWhere ID. This could be used by other organizations to access your [facility] medical records  [id number]        Your Vitals Were     Last Period                   06/10/2009            Blood Pressure from Last 3 Encounters:   09/13/18 113/73   02/08/18 115/65   08/09/17 127/80    Weight from Last 3 Encounters:   09/13/18 179 lb (81.2 kg)   02/08/18 176 lb (79.8 kg)   08/09/17 177 lb (80.3 kg)              We Performed the Following     FLU VACCINE, SPLIT VIRUS, IM (QUADRIVALENT) [94297]- >3 YRS     Vaccine Administration, Initial [67105]        Primary Care Provider Office Phone # Fax #    Rachna Aden [phone]        [address]  [place] [address]        Equal  Access to Services     [facility]: Hadii aad ku hadjessicaizzy Shantaali, wagarrettda luqadaha, qaybta kaalmakristin lopez. So [facility] [phone].    ATENCIÓN: Si habla español, tiene a crum disposición servicios gratuitos de asistencia lingüística. Llame al [phone].    We comply with applicable federal civil rights laws and [place] laws. We do not discriminate on the basis of race, color, national origin, age, disability, sex, sexual orientation, or gender identity.            Thank you!     Thank you for choosing [facility] [place]  for your care. Our goal is always to provide you with excellent care. Hearing back from our patients is one way we can continue to improve our services. Please take a few minutes to complete the written survey that you may receive in the mail after your visit with us. Thank you!             Your Updated Medication List - Protect others around you: Learn how to safely use, store and throw away your medicines at www.disposemymeds.org.          This list is accurate as of 11/14/18 11:39 AM.  Always use your most recent med list.                   Brand Name Dispense Instructions for use Diagnosis    ACE NOT PRESCRIBED (INTENTIONAL)     0 each    ACE Inhibitor not prescribed due to Other:not needed    Type 2 diabetes mellitus without complication (H)       aspirin 81 MG EC tablet     90 tablet    Take 1 tablet (81 mg) by mouth daily    Type 2 diabetes mellitus without complication, without long-term current use of insulin (H)       * atorvastatin 10 MG tablet    LIPITOR    90 tablet    Take 1 tablet (10 mg) by mouth daily    Hyperlipidemia LDL goal <100       * atorvastatin 10 MG tablet    LIPITOR    90 tablet    Take 1 tablet (10 mg) by mouth daily    Hyperlipidemia LDL goal <100, Type 2 diabetes mellitus without complication, without long-term current use of insulin (H), Fatty liver       blood glucose monitoring test strip    ACCU-CHEK  SMARTVIEW    100 strip    Test 1-2  times a day    Type 2 diabetes mellitus without complication, without long-term current use of insulin (H)       metFORMIN 500 MG tablet    GLUCOPHAGE    180 tablet    Take 2 tablets (1,000 mg) by mouth 2 times daily (with meals)    Type 2 diabetes mellitus without complication, without long-term current use of insulin (H)       omeprazole 20 MG CR capsule    priLOSEC    90 capsule    Take 1 capsule (20 mg) by mouth daily    Gastroesophageal reflux disease, esophagitis presence not specified, Cough       vitamin B complex with vitamin C Tabs tablet      Take 1 tablet by mouth daily        vitamin D 2000 units tablet      Take 1 tablet by mouth daily.        * Notice:  This list has 2 medication(s) that are the same as other medications prescribed for you. Read the directions carefully, and ask your doctor or other care provider to review them with you.

## 2018-12-06 ENCOUNTER — OFFICE VISIT (OUTPATIENT)
Dept: FAMILY MEDICINE | Facility: CLINIC | Age: 58
End: 2018-12-06
Payer: COMMERCIAL

## 2018-12-06 VITALS
DIASTOLIC BLOOD PRESSURE: 82 MMHG | WEIGHT: 177 LBS | SYSTOLIC BLOOD PRESSURE: 133 MMHG | BODY MASS INDEX: 35.75 KG/M2 | HEART RATE: 64 BPM | TEMPERATURE: 96 F

## 2018-12-06 DIAGNOSIS — K80.20 CALCULUS OF GALLBLADDER WITHOUT CHOLECYSTITIS WITHOUT OBSTRUCTION: ICD-10-CM

## 2018-12-06 DIAGNOSIS — R10.13 ABDOMINAL PAIN, EPIGASTRIC: Primary | ICD-10-CM

## 2018-12-06 PROCEDURE — 99214 OFFICE O/P EST MOD 30 MIN: CPT | Performed by: INTERNAL MEDICINE

## 2018-12-06 NOTE — PROGRESS NOTES
SUBJECTIVE:   Lis Kevin is a 58 year old female who presents to clinic today for the following health issues:      ABDOMINAL PAIN     Onset: Monday morning    Description:   Character: Stabbing  Location: right upper quadrant left upper quadrant  Radiation: None    Intensity: moderate    Progression of Symptoms:  same    Accompanying Signs & Symptoms:  Fever/Chills?: no   Gas/Bloating: YES  Nausea: YES  Vomitting: YES  Diarrhea?: no   Constipation:YES  Dysuria or Hematuria: no    History:   Trauma: no   Previous similar pain: no    Previous tests done: none    Precipitating factors:   Does the pain change with:     Food: YES     BM: YES- feels a lot of pain    Urination: no     Alleviating factors:  Metamucil helped a little     Therapies Tried and outcome: Metamusil, Prune juice    LMP:  not applicable     Lis is here with abdominal pain and bloating.  This is been going on for about 4 days now.  She reports feeling quite bloated/pressure all the time.  She gets short bursts of more stabbing type pain across upper abdomen after she eats.  Initially she had some nausea and vomited once.  She is constipated, uses prunes and Metamucil at home.  This feels different than her heartburn.    She had gallstones noted on an ultrasound in 2015.  There is a positive H. pylori test from 2006, though the patient does not recall this and I cannot find it documented that she was treated.  She takes omeprazole for heartburn.        Problem list and histories reviewed & adjusted, as indicated.    Reviewed and updated as needed this visit by clinical staff  Tobacco  Allergies  Meds  Med Hx  Surg Hx  Fam Hx  Soc Hx      Reviewed and updated as needed this visit by Provider         ROS:  Const, GI,  reviewed,  otherwise negative unless noted above.       OBJECTIVE:     /82 (Cuff Size: Adult Regular)  Pulse 64  Temp 96  F (35.6  C) (Tympanic)  Wt 177 lb (80.3 kg)  LMP 06/10/2009  BMI 35.75 kg/m2  Body mass  index is 35.75 kg/(m^2).    Gen: well appearing, pleasant obese woman, no distress  HEENT: PERRL, sclera nonicteric, MMM  Pulm: breathing comfortably, CTAB, no wheezes or rales  CV: RRR, normal S1 and S2, 2/6 systolic murmurs best heard at LUSB  Abd: BS present, soft, mild tenderness across upper abdomen, no rebound or guarding  Ext: no LE edema         ASSESSMENT/PLAN:     1. Abdominal pain, epigastric  Wide differential including constipation, biliary colic, GERD, H pylori, atypical viral illness, IBS.  Will start with rechecking H pylori and having her do bowel clean out with mag citrate.    - H Pylori antigen, stool; Future    F/U - TBD        Marsha Abraham MD  [facility]

## 2018-12-06 NOTE — PATIENT INSTRUCTIONS
Try a dose of magnesium citrate 300ml over the counter to clean out the bowels.     Please return the test for H pylori.

## 2018-12-06 NOTE — MR AVS SNAPSHOT
After Visit Summary   12/6/2018    Lis Kevin    MRN: [id number]           Patient Information     Date Of Birth          1960        Visit Information        Provider Department      12/6/2018 12:30 PM Marsha Abraham MD; [place] [employer]        Today's Diagnoses     Abdominal pain, epigastric    -  1      Care Instructions    Try a dose of magnesium citrate 300ml over the counter to clean out the bowels.     Please return the test for H pylori.            Follow-ups after your visit        Follow-up notes from your care team     Return in about 2 weeks (around 12/20/2018), or if symptoms worsen or fail to improve.      Future tests that were ordered for you today     Open Future Orders        Priority Expected Expires Ordered    H Pylori antigen, stool Routine  1/5/2019 12/6/2018            Who to contact     If you have questions or need follow up information about today's clinic visit or your schedule please contact [facility] directly at [phone].  Normal or non-critical lab and imaging results will be communicated to you by MyChart, letter or phone within 4 business days after the clinic has received the results. If you do not hear from us within 7 days, please contact the clinic through MyChart or phone. If you have a critical or abnormal lab result, we will notify you by phone as soon as possible.  Submit refill requests through [employer]mailer or call your pharmacy and they will forward the refill request to us. Please allow 3 business days for your refill to be completed.          Additional Information About Your Visit        Care EveryWhere ID     This is your Care EveryWhere ID. This could be used by other organizations to access your [facility] medical records  [id number]        Your Vitals Were     Pulse Temperature Last Period BMI (Body Mass Index)          64 96  F (35.6  C) (Tympanic) 06/10/2009 35.75 kg/m2          Blood Pressure from Last 3 Encounters:   12/06/18 133/82   09/13/18 113/73   02/08/18 115/65    Weight from Last 3 Encounters:   12/06/18 177 lb (80.3 kg)   09/13/18 179 lb (81.2 kg)   02/08/18 176 lb (79.8 kg)               Primary Care Provider Office Phone # Fax #    Rachna Jet Aden [phone]       [address]  [place] [address]        Equal Access to Services     [facility]: Hadii aad ku hadasho Soomaali, waaxda luqadaha, qaybta kaalmada adeegyada, waxdg copeland . So [facility] [phone].    ATENCIÓN: Si habla español, tiene a crum disposición servicios gratuitos de asistencia lingüística. [facility] [phone].    We comply with applicable federal civil rights laws and [place] laws. We do not discriminate on the basis of race, color, national origin, age, disability, sex, sexual orientation, or gender identity.            Thank you!     Thank you for choosing [facility] [place]  for your care. Our goal is always to provide you with excellent care. Hearing back from our patients is one way we can continue to improve our services. Please take a few minutes to complete the written survey that you may receive in the mail after your visit with us. Thank you!             Your Updated Medication List - Protect others around you: Learn how to safely use, store and throw away your medicines at www.disposemymeds.org.          This list is accurate as of 12/6/18  1:11 PM.  Always use your most recent med list.                   Brand Name Dispense Instructions for use Diagnosis    ACE NOT PRESCRIBED (INTENTIONAL)     0 each    ACE Inhibitor not prescribed due to Other:not needed    Type 2 diabetes mellitus without complication (H)       aspirin 81 MG EC tablet    ASA    90 tablet    Take 1 tablet (81 mg) by mouth daily    Type 2 diabetes mellitus without complication, without long-term current use of insulin (H)       atorvastatin 10 MG tablet     LIPITOR    90 tablet    Take 1 tablet (10 mg) by mouth daily    Hyperlipidemia LDL goal <100       blood glucose monitoring test strip    ACCU-CHEK SMARTVIEW    100 strip    Test 1-2  times a day    Type 2 diabetes mellitus without complication, without long-term current use of insulin (H)       metFORMIN 500 MG tablet    GLUCOPHAGE    180 tablet    Take 2 tablets (1,000 mg) by mouth 2 times daily (with meals)    Type 2 diabetes mellitus without complication, without long-term current use of insulin (H)       omeprazole 20 MG DR capsule    priLOSEC    90 capsule    Take 1 capsule (20 mg) by mouth daily    Gastroesophageal reflux disease, esophagitis presence not specified, Cough       vitamin B complex with vitamin C tablet      Take 1 tablet by mouth daily        vitamin D3 2000 units tablet    CHOLECALCIFEROL     Take 1 tablet by mouth daily.

## 2018-12-07 DIAGNOSIS — R10.13 ABDOMINAL PAIN, EPIGASTRIC: ICD-10-CM

## 2018-12-07 PROCEDURE — 87338 HPYLORI STOOL AG IA: CPT | Performed by: INTERNAL MEDICINE

## 2018-12-10 ENCOUNTER — TELEPHONE (OUTPATIENT)
Dept: FAMILY MEDICINE | Facility: CLINIC | Age: 58
End: 2018-12-10

## 2018-12-10 DIAGNOSIS — A04.8 H. PYLORI INFECTION: Primary | ICD-10-CM

## 2018-12-10 LAB
H PYLORI AG STL QL IA: ABNORMAL
SPECIMEN SOURCE: ABNORMAL

## 2018-12-10 RX ORDER — AMOXICILLIN 500 MG/1
1000 CAPSULE ORAL 2 TIMES DAILY
Qty: 56 CAPSULE | Refills: 0 | Status: SHIPPED | OUTPATIENT
Start: 2018-12-10 | End: 2019-06-17

## 2018-12-10 RX ORDER — CLARITHROMYCIN 500 MG
500 TABLET ORAL 2 TIMES DAILY
Qty: 28 TABLET | Refills: 0 | Status: SHIPPED | OUTPATIENT
Start: 2018-12-10 | End: 2019-06-17

## 2018-12-10 NOTE — TELEPHONE ENCOUNTER
Please call Lis and let her know the H pylori test is positive.  I sent in medications to her pharmacy.  We should plan to repeat the stool test at least one month after completing the antibiotic course to make sure it was treated.     Marsha Abraham MD

## 2018-12-10 NOTE — TELEPHONE ENCOUNTER
Patient given result message from Dr. Abraham with the assistance of .   Patient agrees to take prescription and repeat stool test at least one month after completing the 2 week course of antibiotic.  Lab order pended.  Linsey Berry RN

## 2019-01-24 DIAGNOSIS — A04.8 H. PYLORI INFECTION: ICD-10-CM

## 2019-01-24 PROCEDURE — 87338 HPYLORI STOOL AG IA: CPT | Performed by: INTERNAL MEDICINE

## 2019-01-25 LAB
H PYLORI AG STL QL IA: NORMAL
SPECIMEN SOURCE: NORMAL

## 2019-01-28 ENCOUNTER — TRANSFERRED RECORDS (OUTPATIENT)
Dept: HEALTH INFORMATION MANAGEMENT | Facility: CLINIC | Age: 59
End: 2019-01-28

## 2019-01-28 LAB — RETINOPATHY: NEGATIVE

## 2019-03-06 DIAGNOSIS — E11.9 TYPE 2 DIABETES MELLITUS WITHOUT COMPLICATION, WITHOUT LONG-TERM CURRENT USE OF INSULIN (H): ICD-10-CM

## 2019-03-06 NOTE — TELEPHONE ENCOUNTER
Reason for Call:  Medication or medication refill:    Do you use a [facility]?  Name of the pharmacy and phone number for the current request:    [facility] [id number] [place], [place] [address], [place] - [address] AT [facility] [place] [address]  Name of the medication requested: metFORMIN (GLUCOPHAGE) 500 MG tablet    Other request: NA    Can we leave a detailed message on this number? YES    Phone number patient can be reached at: Cell number on file:    Telephone Information:   Mobile [phone]       Best Time: ANYTIME     Call taken on 3/6/2019 at 10:46 AM by MAHI DODD

## 2019-06-17 ENCOUNTER — OFFICE VISIT (OUTPATIENT)
Dept: FAMILY MEDICINE | Facility: CLINIC | Age: 59
End: 2019-06-17
Payer: COMMERCIAL

## 2019-06-17 VITALS
HEIGHT: 60 IN | OXYGEN SATURATION: 97 % | HEART RATE: 66 BPM | SYSTOLIC BLOOD PRESSURE: 126 MMHG | RESPIRATION RATE: 14 BRPM | WEIGHT: 176 LBS | TEMPERATURE: 97.2 F | DIASTOLIC BLOOD PRESSURE: 84 MMHG | BODY MASS INDEX: 34.55 KG/M2

## 2019-06-17 DIAGNOSIS — E11.9 TYPE 2 DIABETES MELLITUS WITHOUT COMPLICATION, WITHOUT LONG-TERM CURRENT USE OF INSULIN (H): ICD-10-CM

## 2019-06-17 DIAGNOSIS — R01.1 UNDIAGNOSED CARDIAC MURMURS: Primary | ICD-10-CM

## 2019-06-17 DIAGNOSIS — E78.5 HYPERLIPIDEMIA LDL GOAL <100: ICD-10-CM

## 2019-06-17 DIAGNOSIS — E66.01 MORBID (SEVERE) OBESITY DUE TO EXCESS CALORIES (H): ICD-10-CM

## 2019-06-17 LAB — HBA1C MFR BLD: 7.8 % (ref 0–5.6)

## 2019-06-17 PROCEDURE — 36415 COLL VENOUS BLD VENIPUNCTURE: CPT | Performed by: PHYSICIAN ASSISTANT

## 2019-06-17 PROCEDURE — 99214 OFFICE O/P EST MOD 30 MIN: CPT | Performed by: PHYSICIAN ASSISTANT

## 2019-06-17 PROCEDURE — 83036 HEMOGLOBIN GLYCOSYLATED A1C: CPT | Performed by: PHYSICIAN ASSISTANT

## 2019-06-17 RX ORDER — ATORVASTATIN CALCIUM 10 MG/1
10 TABLET, FILM COATED ORAL DAILY
Qty: 90 TABLET | Refills: 1 | Status: SHIPPED | OUTPATIENT
Start: 2019-06-17 | End: 2019-10-30

## 2019-06-17 RX ORDER — LANCETS
EACH MISCELLANEOUS
Qty: 200 EACH | Refills: 11 | Status: SHIPPED | OUTPATIENT
Start: 2019-06-17

## 2019-06-17 RX ORDER — BLOOD-GLUCOSE METER
EACH MISCELLANEOUS
Qty: 1 KIT | Refills: 0 | Status: SHIPPED | OUTPATIENT
Start: 2019-06-17

## 2019-06-17 ASSESSMENT — MIFFLIN-ST. JEOR: SCORE: 1297.96

## 2019-06-17 NOTE — PROGRESS NOTES
Subjective     Lis Kevin is a 59 year old female who presents to clinic today for the following health issues:    HPI   Diabetes Follow-up      How often are you checking your blood sugar? Two times daily; her machine has broken and needs a new one    What time of day are you checking your blood sugars (select all that apply)?  Before meals    Have you had any blood sugars above 200?  No    Have you had any blood sugars below 70?  No    What symptoms do you notice when your blood sugar is low?  Shaky    What concerns do you have today about your diabetes? She takes her medication, she is also controlling what she eats.      Do you have any of these symptoms? (Select all that apply)  No numbness or tingling in feet.  No redness, sores or blisters on feet.  No complaints of excessive thirst.  No reports of blurry vision.  No significant changes to weight.     Have you had a diabetic eye exam in the last 12 months? Yes- Date of last eye exam: February 2019    BP Readings from Last 2 Encounters:   12/06/18 133/82   09/13/18 113/73     Hemoglobin A1C (%)   Date Value   09/13/2018 7.3 (H)   02/08/2018 6.7 (H)     LDL Cholesterol Calculated (mg/dL)   Date Value   09/13/2018 95   08/09/2017 77       Diabetes Management Resources    Amount of exercise or physical activity: walking for an hour several days per week    Problems taking medications regularly: No    Medication side effects: none    Diet: carbohydrate counting      Lis presents to the clinic today for a follow up of her diabetes.  She hs bee taking metformin 1000 mg BID everyday without side effects.  Her meter recently broke and so she has not been checking hr blood sugar.  She is trying to follow a healthy diet.        Reviewed and updated as needed this visit by Provider         Review of Systems   ROS COMP: Constitutional, HEENT, cardiovascular, pulmonary, GI, , musculoskeletal, neuro, skin, endocrine and psych systems are negative, except as  otherwise noted.      Objective    LMP 06/10/2009   There is no height or weight on file to calculate BMI.  Physical Exam   GENERAL: healthy, alert and no distress  RESP: lungs clear to auscultation - no rales, rhonchi or wheezes  CV: regular rate and rhythm,3/6 systolic murmur noted at LSB on examination  PSYCH: mentation appears normal, affect normal/bright    Diagnostic Test Results:  Labs reviewed in Epic        Assessment & Plan     1. Type 2 diabetes mellitus without complication, without long-term current use of insulin (H)  Medications refilled today, A1C is pending at time that patient left clinic.  Last A1C was 7.2.  If increasing, will consider adding medication along with continued LSMs.  Will also recommend she she check her blood sugar once daily when fasting. I will reach out to her with recommendations when labs available  - aspirin (ASA) 81 MG EC tablet; Take 1 tablet (81 mg) by mouth daily  Dispense: 90 tablet; Refill: 3  - metFORMIN (GLUCOPHAGE) 500 MG tablet; Take 2 tablets (1,000 mg) by mouth 2 times daily (with meals)  Dispense: 180 tablet; Refill: 1  - Hemoglobin A1c  - blood glucose (ACCU-CHEK HOME PLUS) test strip; Use to test blood sugar 1 times daily or as directed.  Ok to substitute alternative if insurance prefers.  Dispense: 100 strip; Refill: prn  - blood glucose monitoring (ACCU-CHEK HOME PLUS) meter device kit; Use to test blood sugar 2 times daily or as directed.  Ok to substitute alternative if insurance prefers.  Dispense: 1 kit; Refill: 0  - blood glucose monitoring (SOFTCLIX) lancets; Use to test blood sugar 2 times daily or as directed.  Ok to substitute alternative if insurance prefers.  Dispense: 200 each; Refill: 11    2. Hyperlipidemia LDL goal <100  - atorvastatin (LIPITOR) 10 MG tablet; Take 1 tablet (10 mg) by mouth daily  Dispense: 90 tablet; Refill: 1    3. Morbid (severe) obesity due to excess calories (H)  Exercise and diet discussed with Lis today    4.  "Undiagnosed cardiac murmurs  Patient has  3/6 systolic murmur noted at LSB on exam today.  She does not recall that this has been mentioned to her in the past, and based on her chart I do not see any previous workup for this.  She is asymptomatic.  I have ordered an echocardiogram to further evaluate this. TC to schedule  - Echocardiogram Complete; Future     BMI:   Estimated body mass index is 34.87 kg/m  as calculated from the following:    Height as of this encounter: 1.513 m (4' 11.57\").    Weight as of this encounter: 79.8 kg (176 lb).       Return in about 6 months (around 12/17/2019) for in september for physical examination and pap.    Audie Villalta PA-C  [facility] [place]      "

## 2019-06-20 ENCOUNTER — TELEPHONE (OUTPATIENT)
Dept: FAMILY MEDICINE | Facility: CLINIC | Age: 59
End: 2019-06-20

## 2019-06-20 DIAGNOSIS — E11.9 TYPE 2 DIABETES MELLITUS WITHOUT COMPLICATION, WITHOUT LONG-TERM CURRENT USE OF INSULIN (H): Primary | ICD-10-CM

## 2019-06-20 RX ORDER — GLIPIZIDE 5 MG/1
5 TABLET ORAL
Qty: 60 TABLET | Refills: 2 | Status: SHIPPED | OUTPATIENT
Start: 2019-06-20 | End: 2019-10-30

## 2019-06-21 NOTE — TELEPHONE ENCOUNTER
Left non-detailed message with assistance of  to call the clinic back at [phone] and ask to speak with a  triage nurse. Linsey Berry RN

## 2019-06-21 NOTE — TELEPHONE ENCOUNTER
Please call patient with A1C results.  These show that her A1C is slightly elevated about goal and has increased from previous.  I am going to start glipizide 5 mg BID.  She should return in 3 months for recheck. This medication can cause low blood sugars.  She should monitor her fasting sugars once daily and let me know if these are low or if she feels shaky or fatigued

## 2019-06-21 NOTE — TELEPHONE ENCOUNTER
Patient called back. Patient given message from Audie Villalta PA-C with assistance of . Patient agrees to start glipizide and return for A1C in 3 months. Agrees to check fasting blood sugars daily and to call if she feels shaky or fatigued.  Linsey Berry RN

## 2019-07-11 ENCOUNTER — ANCILLARY PROCEDURE (OUTPATIENT)
Dept: CARDIOLOGY | Facility: CLINIC | Age: 59
End: 2019-07-11
Attending: PHYSICIAN ASSISTANT
Payer: COMMERCIAL

## 2019-07-11 DIAGNOSIS — R01.1 UNDIAGNOSED CARDIAC MURMURS: ICD-10-CM

## 2019-07-11 PROCEDURE — 93306 TTE W/DOPPLER COMPLETE: CPT | Performed by: INTERNAL MEDICINE

## 2019-10-28 ENCOUNTER — OFFICE VISIT (OUTPATIENT)
Dept: FAMILY MEDICINE | Facility: CLINIC | Age: 59
End: 2019-10-28
Payer: COMMERCIAL

## 2019-10-28 VITALS
DIASTOLIC BLOOD PRESSURE: 76 MMHG | HEIGHT: 60 IN | SYSTOLIC BLOOD PRESSURE: 110 MMHG | HEART RATE: 60 BPM | WEIGHT: 177 LBS | BODY MASS INDEX: 34.75 KG/M2 | TEMPERATURE: 97.7 F | OXYGEN SATURATION: 96 %

## 2019-10-28 DIAGNOSIS — E78.5 HYPERLIPIDEMIA LDL GOAL <100: ICD-10-CM

## 2019-10-28 DIAGNOSIS — N76.0 BV (BACTERIAL VAGINOSIS): ICD-10-CM

## 2019-10-28 DIAGNOSIS — B96.89 BV (BACTERIAL VAGINOSIS): ICD-10-CM

## 2019-10-28 DIAGNOSIS — Z23 NEED FOR PROPHYLACTIC VACCINATION AND INOCULATION AGAINST INFLUENZA: ICD-10-CM

## 2019-10-28 DIAGNOSIS — N95.0 ABNORMAL VAGINAL BLEEDING IN POSTMENOPAUSAL PATIENT: ICD-10-CM

## 2019-10-28 DIAGNOSIS — E11.9 TYPE 2 DIABETES MELLITUS WITH HEMOGLOBIN A1C GOAL OF LESS THAN 7.5% (H): ICD-10-CM

## 2019-10-28 DIAGNOSIS — N89.8 VAGINAL DISCHARGE: ICD-10-CM

## 2019-10-28 DIAGNOSIS — Z00.00 ROUTINE HISTORY AND PHYSICAL EXAMINATION OF ADULT: Primary | ICD-10-CM

## 2019-10-28 DIAGNOSIS — Z12.31 BREAST CANCER SCREENING BY MAMMOGRAM: ICD-10-CM

## 2019-10-28 DIAGNOSIS — Z12.4 SCREENING FOR MALIGNANT NEOPLASM OF CERVIX: ICD-10-CM

## 2019-10-28 LAB
HBA1C MFR BLD: 8.5 % (ref 0–5.6)
SPECIMEN SOURCE: ABNORMAL
WET PREP SPEC: ABNORMAL

## 2019-10-28 PROCEDURE — 90471 IMMUNIZATION ADMIN: CPT | Performed by: FAMILY MEDICINE

## 2019-10-28 PROCEDURE — 99396 PREV VISIT EST AGE 40-64: CPT | Mod: 25 | Performed by: FAMILY MEDICINE

## 2019-10-28 PROCEDURE — 90682 RIV4 VACC RECOMBINANT DNA IM: CPT | Performed by: FAMILY MEDICINE

## 2019-10-28 PROCEDURE — 84443 ASSAY THYROID STIM HORMONE: CPT | Performed by: FAMILY MEDICINE

## 2019-10-28 PROCEDURE — 80061 LIPID PANEL: CPT | Performed by: FAMILY MEDICINE

## 2019-10-28 PROCEDURE — 80053 COMPREHEN METABOLIC PANEL: CPT | Performed by: FAMILY MEDICINE

## 2019-10-28 PROCEDURE — 87624 HPV HI-RISK TYP POOLED RSLT: CPT | Performed by: FAMILY MEDICINE

## 2019-10-28 PROCEDURE — 82043 UR ALBUMIN QUANTITATIVE: CPT | Performed by: FAMILY MEDICINE

## 2019-10-28 PROCEDURE — 99207 C FOOT EXAM  NO CHARGE: CPT | Mod: 25 | Performed by: FAMILY MEDICINE

## 2019-10-28 PROCEDURE — 83036 HEMOGLOBIN GLYCOSYLATED A1C: CPT | Performed by: FAMILY MEDICINE

## 2019-10-28 PROCEDURE — G0145 SCR C/V CYTO,THINLAYER,RESCR: HCPCS | Performed by: FAMILY MEDICINE

## 2019-10-28 PROCEDURE — 99214 OFFICE O/P EST MOD 30 MIN: CPT | Mod: 25 | Performed by: FAMILY MEDICINE

## 2019-10-28 PROCEDURE — 87210 SMEAR WET MOUNT SALINE/INK: CPT | Performed by: FAMILY MEDICINE

## 2019-10-28 PROCEDURE — 36415 COLL VENOUS BLD VENIPUNCTURE: CPT | Performed by: FAMILY MEDICINE

## 2019-10-28 RX ORDER — METRONIDAZOLE 7.5 MG/G
1 GEL VAGINAL DAILY
Qty: 25 G | Refills: 0 | Status: SHIPPED | OUTPATIENT
Start: 2019-10-28 | End: 2019-11-02

## 2019-10-28 ASSESSMENT — ACTIVITIES OF DAILY LIVING (ADL): CURRENT_FUNCTION: NO ASSISTANCE NEEDED

## 2019-10-28 ASSESSMENT — MIFFLIN-ST. JEOR: SCORE: 1302.54

## 2019-10-28 NOTE — PATIENT INSTRUCTIONS
Patient Education     Prevention Guidelines, Women Ages 50 to 64  Screening tests and vaccines are an important part of managing your health. A screening test is done to find possible disorders or diseases in people who don't have any symptoms. The goal is to find a disease early so lifestyle changes can be made and you can be watched more closely to reduce the risk of disease, or to detect it early enough to treat it most effectively. Screening tests are not considered diagnostic, but are used to determine if more testing is needed. Health counseling is essential, too. Below are guidelines for these, for women ages 50 to 64. Talk with your healthcare provider to make sure you re up to date on what you need.  Screening Who needs it How often   Type 2 diabetes or prediabetes All women beginning at age 45 and women without symptoms at any age who are overweight or obese and have 1 or more additional risk factors for diabetes. At  least every 3 years   Type 2 diabetes or prediabetes All women diagnosed with gestational diabetes Lifelong testing every 3 years   Type 2 diabetes All women with prediabetes Every year   Alcohol misuse All women in this age group At routine exams   Blood pressure All women in this age group Yearly checkup if your blood pressure is normal  Normal blood pressure is less than 120/80 mm Hg  If your blood pressure reading is higher than normal, follow the advice of your healthcare provider   Breast cancer All women at average risk in this age group Yearly mammogram should be done until age 54. At age 55, switch to mammograms every other year or choose to continue yearly mammograms.  All women should be familiar with the potential benefits and risks of breast cancer screening with mammograms.      Cervical cancer All women in this age group, except women who have had a complete hysterectomy Pap test every 3 years or Pap test with human papillomavirus (HPV) test every 5 years   Chlamydia Women at  increased risk for infection At routine exams   Colorectal cancer All women in this age group Flexible sigmoidoscopy every 5 years, or colonoscopy every 10 years, or double-contrast barium enema every 5 years; yearly fecal occult blood test or fecal immunochemical test; or a stool DNA test as often as your health care provider advises; talk with your health care provider about which tests are best for you   Depression All women in this age group At routine exams   Gonorrhea Sexually active women at increased risk for infection At routine exams   Hepatitis C Anyone at increased risk; 1 time for those born between 1945 and 1965 At routine exams   High cholesterol or triglycerides All women in this age group who are at risk for coronary artery disease At least every 5 years   HIV All women At routine exams   Lung cancer Adults age 55 to 80 who have smoked Yearly screening in smokers with 30 pack-year history of smoking or who quit within 15 years   Obesity All women in this age group At routine exams   Osteoporosis Women who are postmenopausal Ask your healthcare provider   Syphilis Women at increased risk for infection - talk with your healthcare provider At routine exams   Tuberculosis Women at increased risk for infection - talk with your healthcare provider Ask your healthcare provider   Vision All women in this age group Ask your healthcare provider   Vaccine Who needs it How often   Chickenpox (varicella) All women in this age group who have no record of this infection or vaccine 2 doses; the second dose should be given at least 4 weeks after the first dose   Hepatitis A Women at increased risk for infection - talk with your healthcare provider 2 doses given at least 6 months apart   Hepatitis B Women at increased risk for infection - talk with your healthcare provider 3 doses over 6 months; second dose should be given 1 month after the first dose; the third dose should be given at least 2 months after the second  dose and at least 4 months after the first dose   Haemophilus influenzaeType B (HIB) Women at increased risk for infection - talk with your healthcare provider 1 to 3 doses   Influenza (flu) All women in this age group Once a year   Measles, mumps, rubella (MMR) Women in this age group through their late 50s who have no record of these infections or vaccines 1 dose   Meningococcal Women at increased risk for infection - talk with your healthcare provider 1 or more doses   Pneumococcal conjugate vaccine (PCV13) and pneumococcal polysaccharide vaccine (PPSV23) Women at increased risk for infection - talk with your healthcare provider PCV13: 1 dose ages 19 to 65 (protects against 13 types of pneumococcal bacteria)  PPSV23: 1 to 2 doses through age 64, or 1 dose at 65 or older (protects against 23 types of pneumococcal bacteria)   Tetanus/diphtheria/pertussis (Td/Tdap) booster All women in this age group Td every 10 years, or a one-time dose of Tdap instead of a Td booster after age 18, then Td every 10 years   Zoster All women ages 60 and older 1 dose   Counseling Who needs it How often   BRCA gene mutation testing for breast and ovarian cancer susceptibility Women with increased risk for having gene mutation When your risk is known   Breast cancer and chemoprevention Women at high risk for breast cancer When your risk is known   Diet and exercise Women who are overweight or obese When diagnosed, and then at routine exams   Sexually transmitted infection prevention Women at increased risk for infection - talk with your healthcare provider At routine exams   Use of daily aspirin Women ages 55 and up in this age group who are at risk for cardiovascular health problems such as stroke When your risk is known   Use of tobacco and the health effects it can cause All women in this age group Every exam   1American Cancer Society  Date Last Reviewed: 1/26/2016 2000-2018 [employer] [address],  [place] [address]. All rights reserved. This information is not intended as a substitute for professional medical care. Always follow your healthcare professional's instructions.

## 2019-10-28 NOTE — LETTER
November 6, 2019    Lis Kevin  [address]  [place] [address]    Dear ,  This letter is regarding your recent Pap smear (cervical cancer screening) and Human Papillomavirus (HPV) test.  We are happy to inform you that your Pap smear result is normal. Cervical cancer is closely linked with certain types of HPV. Your results showed no evidence of high-risk HPV.  We recommend you have your next PAP smear in 3 years.  You will still need to return to the clinic every year for an annual exam and other preventive tests.  If you have additional questions regarding this result, please call our registered nurse, Ro at [phone].  Sincerely,    Rachna Aden MD /[facility]

## 2019-10-28 NOTE — LETTER
October 29, 2019      Lis Franklino  [address]  [place] [address]        Dear ,    We are writing to inform you of your test results.    Normal thyroid ( TSH)   Normal urine micro albumin (diabetes test for kidneys)   Normal , kidney functions, calcium,  and  electrolytes(various salts in your body)       Liver tests are mildly  Abnormal  ( likely related to fatty liver) . Ok to watch and return to clinic for a retest in 4-6  weeks   In the meantime you should  avoid high fat diet, alcohol and OTC tylenol like medication, to protect your liver .   Lipid remains quiet elevated, and LDL (bad cholesterol ) is much higher then previous. so recommend  Should be taking cholesterol medication regularly     In the meantime need to watch diet( low fat, low cholesterol, high fiber diet) exercise. May also take omega-3 fatty acid( fish oil or flex seed oil capsule OTC) to help improve lipid   Please call clinic to schedule appointment   Hemoglobin A1C ( DM test) is high and shows inadequate control of your diabetes.  You will need to monitor glucose closely.      Also should  continue to manage your DM with diet control and exercise. Need medications adjusted .You  should schedule a  follow up office visit to discuss results and treatment.     Resulted Orders   Lipid panel reflex to direct LDL Fasting   Result Value Ref Range    Cholesterol 244 (H) <200 mg/dL      Comment:      Desirable:       <200 mg/dl    Triglycerides 216 (H) <150 mg/dL      Comment:      Borderline high:  150-199 mg/dl  High:             200-499 mg/dl  Very high:       >499 mg/dl  Fasting specimen      HDL Cholesterol 46 (L) >49 mg/dL    LDL Cholesterol Calculated 155 (H) <100 mg/dL      Comment:      Above desirable:  100-129 mg/dl  Borderline High:  130-159 mg/dL  High:             160-189 mg/dL  Very high:       >189 mg/dl      Non HDL Cholesterol 198 (H) <130 mg/dL      Comment:      Above Desirable:  130-159 mg/dl  Borderline  high:  160-189 mg/dl  High:             190-219 mg/dl  Very high:       >219 mg/dl     Albumin Random Urine Quantitative with Creat Ratio   Result Value Ref Range    Creatinine Urine 140 mg/dL    Albumin Urine mg/L 21 mg/L    Albumin Urine mg/g Cr 14.79 0 - 25 mg/g Cr   TSH with free T4 reflex   Result Value Ref Range    TSH 3.71 0.40 - 4.00 mU/L   Comprehensive metabolic panel   Result Value Ref Range    Sodium 135 133 - 144 mmol/L    Potassium 4.2 3.4 - 5.3 mmol/L    Chloride 103 94 - 109 mmol/L    Carbon Dioxide 26 20 - 32 mmol/L    Anion Gap 6 3 - 14 mmol/L    Glucose 175 (H) 70 - 99 mg/dL      Comment:      Fasting specimen    Urea Nitrogen 9 7 - 30 mg/dL    Creatinine 0.52 0.52 - 1.04 mg/dL    GFR Estimate >90 >60 mL/min/[1.73_m2]      Comment:      Non  GFR Calc  Starting 12/18/2018, serum creatinine based estimated GFR (eGFR) will be   calculated using the Chronic Kidney Disease Epidemiology Collaboration   (CKD-EPI) equation.      GFR Estimate If Black >90 >60 mL/min/[1.73_m2]      Comment:       GFR Calc  Starting 12/18/2018, serum creatinine based estimated GFR (eGFR) will be   calculated using the Chronic Kidney Disease Epidemiology Collaboration   (CKD-EPI) equation.      Calcium 8.8 8.5 - 10.1 mg/dL    Bilirubin Total 0.8 0.2 - 1.3 mg/dL    Albumin 3.9 3.4 - 5.0 g/dL    Protein Total 7.6 6.8 - 8.8 g/dL    Alkaline Phosphatase 91 40 - 150 U/L     (H) 0 - 50 U/L    AST 55 (H) 0 - 45 U/L   Hemoglobin A1c   Result Value Ref Range    Hemoglobin A1C 8.5 (H) 0 - 5.6 %      Comment:      Normal <5.7% Prediabetes 5.7-6.4%  Diabetes 6.5% or higher - adopted from ADA   consensus guidelines.     Wet prep   Result Value Ref Range    Specimen Description Cervix     Wet Prep No Trichomonas seen     Wet Prep Clue cells seen  Moderate   (A)     Wet Prep No yeast seen     Wet Prep WBC'S seen  Many          If you have any questions or concerns, please call the clinic at the number  listed above.       Sincerely,        Rachna Aden MD

## 2019-10-28 NOTE — PROGRESS NOTES
"   SUBJECTIVE:   CC: Lis Kevin is an 59 year old woman who presents for preventive health visit.     Healthy Habits:    In general, how would you rate your overall health?  Good    Frequency of exercise:  2-3 days/week    Do you usually eat at least 4 servings of fruit and vegetables a day, include whole grains    & fiber and avoid regularly eating high fat or \"junk\" foods?  Yes    Taking medications regularly:  Yes    Barriers to taking medications:  None    Medication side effects:  None    Ability to successfully perform activities of daily living:  No assistance needed    Home Safety:  No safety concerns identified    Hearing Impairment:  No hearing concerns    In the past 6 months, have you been bothered by leaking of urine?  No    In general, how would you rate your overall mental or emotional health?  Good      PHQ-2 Total Score:    Additional concerns today:  No          PROBLEMS TO ADD ON...  She has noted some vaginal spotting recently lasted for several days.  She denies any significant vaginal discharge itching abdominal pain etc..  Denies any urinary symptoms.  She is postmenopausal for few years so concerned with the vaginal spotting.   Diabetes Follow-up     She is  due for diabetes follow up. She is fastin for her labs     How often are you checking your blood sugar? A few times a month  usually  100- 110     What time of day are you checking your blood sugars (select all that apply)?  Before and after meals    Have you had any blood sugars above 200?  No    Have you had any blood sugars below 70?  No    What symptoms do you notice when your blood sugar is low?  None    What concerns do you have today about your diabetes? None and Other: due for labs and need refill on medication . Need a new monitor since old one is not working and battery is dead now      Do you have any of these symptoms? (Select all that apply)  No numbness or tingling in feet.  No redness, sores or blisters on feet.  No " complaints of excessive thirst.  No reports of blurry vision.  No significant changes to weight.     Have you had a diabetic eye exam in the last 12 months? Yes- Date of last eye exam: see [facility]     Diabetes Management Resources    Hyperlipidemia Follow-Up      Are you having any of the following symptoms? (Select all that apply)  No complaints of shortness of breath, chest pain or pressure.  No increased sweating or nausea with activity.  No left-sided neck or arm pain.  No complaints of pain in calves when walking 1-2 blocks.    Are you regularly taking any medication or supplement to lower your cholesterol?   Yes- see epic     Are you having muscle aches or other side effects that you think could be caused by your cholesterol lowering medication?  No    Hypertension Follow-up      Do you check your blood pressure regularly outside of the clinic? Yes     Are you following a low salt diet? Yes    Are your blood pressures ever more than 140 on the top number (systolic) OR more   than 90 on the bottom number (diastolic), for example 140/90? No    BP Readings from Last 2 Encounters:   10/28/19 110/76   06/17/19 126/84     Hemoglobin A1C (%)   Date Value   06/17/2019 7.8 (H)   09/13/2018 7.3 (H)     LDL Cholesterol Calculated (mg/dL)   Date Value   09/13/2018 95   08/09/2017 77       Today's PHQ-2 Score:   PHQ-2 ( 1999 [employer]) 10/28/2019   Q1: Little interest or pleasure in doing things 0   Q2: Feeling down, depressed or hopeless 0   PHQ-2 Score 0       Abuse: Current or Past(Physical, Sexual or Emotional)- No  Do you feel safe in your environment? Yes    Social History     Tobacco Use     Smoking status: Never Smoker     Smokeless tobacco: Never Used   Substance Use Topics     Alcohol use: No     If you drink alcohol do you typically have >3 drinks per day or >7 drinks per week? No    Alcohol Use 8/9/2017   Prescreen: >3 drinks/day or >7 drinks/week? The patient does not drink >3 drinks per day nor >7 drinks per week.    No flowsheet data found.    Reviewed orders with patient.  Reviewed health maintenance and updated orders accordingly - Yes  Patient Active Problem List   Diagnosis     Abnormal weight gain     Dyspepsia and other specified disorders of function of stomach     Other acne     Calcific tendonitis     Abnormal LFTs     Other postprocedural status(V45.89)     Dysmetabolic syndrome X     Hyperlipidemia LDL goal <100     Pain in joint, shoulder region     Dry eyes     Hyperplastic colon polyp     Serrated adenoma of colon     Backache     Gall stone     DM type 2 goal A1C below 7.5     Fatty liver     Type 2 diabetes mellitus without complication (H)     Obesity, Class II, BMI 35-39.9, with comorbidity     Symptomatic menopausal or female climacteric states     Bilateral thumb pain     Type 2 diabetes mellitus without complication, without long-term current use of insulin (H)     Morbid (severe) obesity due to excess calories (H)     Past Surgical History:   Procedure Laterality Date     C DEXA INTERPRETATION, AXIAL  8/10    normal bone mineral density      C NONSPECIFIC PROCEDURE      BLT     COLONOSCOPY  3/12    repeat in 2015       Social History     Tobacco Use     Smoking status: Never Smoker     Smokeless tobacco: Never Used   Substance Use Topics     Alcohol use: No     Family History   Problem Relation Age of Onset     Hypertension Mother      Diabetes Sister      Hyperlipidemia No family hx of      Coronary Artery Disease No family hx of      Cancer - colorectal No family hx of      Breast Cancer No family hx of            Mammogram Screening: Patient over age 50, mutual decision to screen reflected in health maintenance.    Pertinent mammograms are reviewed under the imaging tab.  History of abnormal Pap smear: NO - age 30- 65 PAP every 3 years recommended  PAP / HPV 6/7/2016 4/28/2014 2/14/2013   PAP NIL NIL ASC-US(A)     Reviewed and updated as needed this visit by clinical staff         Reviewed and updated  as needed this visit by Provider        Past Medical History:   Diagnosis Date     Diabetes in pregnancy      Dyspepsia and other specified disorders of function of stomach     pos.H.pylori tx'ed     Other and unspecified hyperlipidemia         Review of Systems  CONSTITUTIONAL: NEGATIVE for fever, chills, change in weight  INTEGUMENTARU/SKIN: NEGATIVE for worrisome rashes, moles or lesions  EYES: NEGATIVE for vision changes or irritation  ENT: NEGATIVE for ear, mouth and throat problems  RESP: NEGATIVE for significant cough or SOB  BREAST: NEGATIVE for masses, tenderness or discharge  CV: NEGATIVE for chest pain, palpitations or peripheral edema  GI: NEGATIVE for nausea, abdominal pain, heartburn, or change in bowel habits  : NEGATIVE for unusual urinary or vaginal symptoms. Periods are regular.  MUSCULOSKELETAL: NEGATIVE for significant arthralgias or myalgia  NEURO: NEGATIVE for weakness, dizziness or paresthesias  ENDOCRINE: NEGATIVE for temperature intolerance, skin/hair changes and except as per HPI   PSYCHIATRIC: NEGATIVE for changes in mood or affect     OBJECTIVE:   LMP 06/10/2009   Physical Exam  GENERAL APPEARANCE: healthy, alert and no distress  EYES: Eyes grossly normal to inspection,  and conjunctivae and sclerae normal  HENT: ear canals and TM's normal, nose and mouth without ulcers or lesions, oropharynx clear and oral mucous membranes moist  NECK: no adenopathy, no asymmetry, masses, or scars and thyroid normal to palpation  RESP: lungs clear to auscultation - no rales, rhonchi or wheezes  BREAST: normal without masses, tenderness or nipple discharge and no palpable axillary masses or adenopathy  CV: regular rate and rhythm, normal S1 S2, no S3 or S4,  no peripheral edema   ABDOMEN: soft, nontender, no hepatosplenomegaly, no masses and bowel sounds normal   (female): normal female external genitalia, normal urethral meatus, vaginal mucosal atrophy noted, normal cervix, adnexae, and uterus  slightly prominent but without masses has small amount of abnormal discharge, so wet prep taken  MS: no musculoskeletal defects are noted and gait is age appropriate without ataxia  SKIN: no suspicious lesions or rashes  NEURO: Normal strength and tone, sensory exam grossly normal, mentation intact and speech normal  PSYCH: mentation appears normal and affect normal  Foot exam : No lesion , normal sensation by monofilament. Normal peripheral pulses  .       ASSESSMENT/PLAN:   (Z00.00) Routine history and physical examination of adult  (primary encounter diagnosis)  Comment:   Plan: Lipid panel reflex to direct LDL Fasting, Pap         imaged thin layer screen with HPV - recommended        age 30 - 65 years (select HPV order below), HPV        High Risk Types DNA Cervical, *MA Screening         Digital Bilateral                      (E11.9) DM type 2 goal A1C below 7.5  Comment:   Plan: Lipid panel reflex to direct LDL Fasting,         Albumin Random Urine Quantitative with Creat         Ratio, HPV High Risk Types DNA Cervical, TSH         with free T4 reflex, Comprehensive metabolic         panel, Hemoglobin A1c, blood glucose monitoring        (NO BRAND SPECIFIED) meter device kit             Discussed cares, diet/ exercise . Talked about DM management , health risk etc.       gave refill on meds. Check lab, call pt with results.  Follow up as needed          (E78.5) Hyperlipidemia LDL goal <100  Comment: Also has history of fatty liver  Plan: Lipid panel reflex to direct LDL Fasting,         Comprehensive metabolic panel            (Z12.4) Screening for malignant neoplasm of cervix  Comment:   Plan: Pap imaged thin layer screen with HPV -         recommended age 30 - 65 years (select HPV order        below), HPV High Risk Types DNA Cervical            (Z12.31) Breast cancer screening by mammogram  Comment:   Plan: *MA Screening Digital Bilateral            (N95.0) Abnormal vaginal bleeding in postmenopausal  "patient  Comment: mild   Plan: US Pelvic Complete w Transvaginal, OB/GYN         REFERRAL        Discussed possible causes for that.  On the status recommend proceeding for the evaluation.  Ultrasound.  Given referral to see GYN.  Follow-up here as needed.     (N89.8) Vaginal discharge  Comment:   Plan: Wet prep          Wet prep shows positive clue  cell.  Treated patient for BV.  Prescription faxed.  Follow-up here as needed.         Check labs. refill sent.Cares and  treatment discussed. follow up if problem   Patient expressed understanding and agreement with treatment plan. All patient's questions were answered, will let me know if has more later.  Medications: Rx's: Reviewed the potential side effects/complications of medications prescribed.     COUNSELING:  Reviewed preventive health counseling, as reflected in patient instructions       Regular exercise       Healthy diet/nutrition       Vision screening       Hearing screening       Immunizations    Vaccinated for: Influenza and     Declined:   Zoster due to Other will  consider                Osteoporosis Prevention/Bone Health    Estimated body mass index is 34.87 kg/m  as calculated from the following:    Height as of 6/17/19: 1.513 m (4' 11.57\").    Weight as of 6/17/19: 79.8 kg (176 lb).    Weight management plan: Discussed healthy diet and exercise guidelines     reports that she has never smoked. She has never used smokeless tobacco.      Counseling Resources:  ATP IV Guidelines  Pooled Cohorts Equation Calculator  Breast Cancer Risk Calculator  FRAX Risk Assessment  ICSI Preventive Guidelines  Dietary Guidelines for Americans, 2010  USDA's MyPlate  ASA Prophylaxis  Lung CA Screening    Rachna Aden MD  [facility] [place]    "

## 2019-10-29 LAB
ALBUMIN SERPL-MCNC: 3.9 G/DL (ref 3.4–5)
ALP SERPL-CCNC: 91 U/L (ref 40–150)
ALT SERPL W P-5'-P-CCNC: 101 U/L (ref 0–50)
ANION GAP SERPL CALCULATED.3IONS-SCNC: 6 MMOL/L (ref 3–14)
AST SERPL W P-5'-P-CCNC: 55 U/L (ref 0–45)
BILIRUB SERPL-MCNC: 0.8 MG/DL (ref 0.2–1.3)
BUN SERPL-MCNC: 9 MG/DL (ref 7–30)
CALCIUM SERPL-MCNC: 8.8 MG/DL (ref 8.5–10.1)
CHLORIDE SERPL-SCNC: 103 MMOL/L (ref 94–109)
CHOLEST SERPL-MCNC: 244 MG/DL
CO2 SERPL-SCNC: 26 MMOL/L (ref 20–32)
CREAT SERPL-MCNC: 0.52 MG/DL (ref 0.52–1.04)
CREAT UR-MCNC: 140 MG/DL
GFR SERPL CREATININE-BSD FRML MDRD: >90 ML/MIN/{1.73_M2}
GLUCOSE SERPL-MCNC: 175 MG/DL (ref 70–99)
HDLC SERPL-MCNC: 46 MG/DL
LDLC SERPL CALC-MCNC: 155 MG/DL
MICROALBUMIN UR-MCNC: 21 MG/L
MICROALBUMIN/CREAT UR: 14.79 MG/G CR (ref 0–25)
NONHDLC SERPL-MCNC: 198 MG/DL
POTASSIUM SERPL-SCNC: 4.2 MMOL/L (ref 3.4–5.3)
PROT SERPL-MCNC: 7.6 G/DL (ref 6.8–8.8)
SODIUM SERPL-SCNC: 135 MMOL/L (ref 133–144)
TRIGL SERPL-MCNC: 216 MG/DL
TSH SERPL DL<=0.005 MIU/L-ACNC: 3.71 MU/L (ref 0.4–4)

## 2019-10-30 ENCOUNTER — OFFICE VISIT (OUTPATIENT)
Dept: FAMILY MEDICINE | Facility: CLINIC | Age: 59
End: 2019-10-30
Payer: COMMERCIAL

## 2019-10-30 VITALS
BODY MASS INDEX: 34.75 KG/M2 | OXYGEN SATURATION: 97 % | SYSTOLIC BLOOD PRESSURE: 128 MMHG | DIASTOLIC BLOOD PRESSURE: 68 MMHG | HEIGHT: 60 IN | HEART RATE: 61 BPM | TEMPERATURE: 97.1 F | WEIGHT: 177 LBS

## 2019-10-30 DIAGNOSIS — E11.9 TYPE 2 DIABETES MELLITUS WITHOUT COMPLICATION, WITHOUT LONG-TERM CURRENT USE OF INSULIN (H): Primary | ICD-10-CM

## 2019-10-30 DIAGNOSIS — E78.5 HYPERLIPIDEMIA LDL GOAL <100: ICD-10-CM

## 2019-10-30 LAB
COPATH REPORT: NORMAL
PAP: NORMAL

## 2019-10-30 PROCEDURE — 99214 OFFICE O/P EST MOD 30 MIN: CPT | Performed by: FAMILY MEDICINE

## 2019-10-30 RX ORDER — ATORVASTATIN CALCIUM 10 MG/1
10 TABLET, FILM COATED ORAL DAILY
Qty: 90 TABLET | Refills: 1 | Status: SHIPPED | OUTPATIENT
Start: 2019-10-30 | End: 2020-04-28

## 2019-10-30 RX ORDER — GLIPIZIDE 5 MG/1
5 TABLET ORAL
Qty: 60 TABLET | Refills: 2 | Status: SHIPPED | OUTPATIENT
Start: 2019-10-30 | End: 2020-04-28

## 2019-10-30 ASSESSMENT — MIFFLIN-ST. JEOR: SCORE: 1302.54

## 2019-10-30 NOTE — PATIENT INSTRUCTIONS
Patient Education     La diabetes: Consejos para hacer actividades físicas    Aumentar crum nivel de actividad física puede ayudarle a controlar la diabetes. Los consejos de esta página le ayudarán a aprovechar crum ejercicio al louisa y a proteger crum seguridad.  Benefíciese con brío  Las actividades enérgicas aceleran el ritmo cardíaco, lo que puede ayudarle a mejorar crum forma física, perder el peso excesivo y controlar crum nivel de azúcar en la mini. Pruebe a caminar con energía y brío. Si tiene problemas en los pies o las piernas, pruebe a hacer natación o montar en bicicleta. Puede dividir nataly sesiones de ejercicio en varios intervalos a lo jose f del día. Avance gradualmente hasta hacer por lo menos 30 minutos de ejercicio continuo y enérgico irina todos los shantal.  Kimberly ejercicios de calentamiento y enfriamiento  Los ejercicios de calentamiento y enfriamiento reducen el riesgo de lesiones y el dolor muscular. Antes y después de crum rutina de ejercicios, kimberly cassie versión suave de crum actividad liz 5 minutos. También puede aprender a hacer estiramientos para mantener los músculos relajados. Crum proveedor de atención médica puede enseñarle buenos ejercicios de calentamiento y estiramiento.  Kimberly la prueba del habla-saurabh  La prueba del habla-saurabh es cassie manera sencilla de medir el esfuerzo que usted hace liz crum ejercicio. Si puede hablar mientras hace ejercicios, significa que crum actividad tiene el ritmo adecuado; angela si le falta el aire, disminuya la marcha. Si puede tararear cassie canción, significa que debe acelerar el ritmo. Suba cassie randy, aumente la resistencia de crum bicicleta estacionaria o nade más rápido.   Qué lexie hacer respecto a las comidas?  Es posible que le indiquen que planifique crum actividad física para 1-2 horas después de cassie comida. En la mayoría de los casos, no es necesario que coma mientras hace ejercicio. Si usted se pone insulina o constantino medicamentos que pueden provocar la disminución  de los niveles de azúcar en la mini, hágase el examen antes de hacer ejercicios. Lleve consigo un azúcar de acción rápida, norma tabletas de glucosa o caramelos duros, que le elevará rápidamente el nivel de azúcar en la mini. Si tiene síntomas de hipoglucemia (bajo nivel de azúcar en la mini), use grabiel azúcar.  Consejos de seguridad  Estos consejos le ayudarán a mantener crum seguridad mientras mejora crum forma física:    Kimberly ejercicios con un amigo o lleve consigo un teléfono celular, si tiene racheal.    Lleve o póngase cassie identificación, norma cassie pulsera o cassie de jesus, que indique que usted tiene diabetes.    Use zapatos y equipo de seguridad apropiados para crum actividad.    [place]le[place]ito agua antes, liz y después del ejercicio.    Póngase ropa adecuada para el clima.    No kimberly ejercicio a la intemperie si hace demasiado calor o demasiado frío.    No kimberly ejercicio si está enfermo.    Si le indican que lo kimberly, mídase el azúcar en la mini antes y después de hacer ejercicio. Coma un bocadillo de carbohidrato si crum nivel de azúcar es bajo antes de iniciar el ejercicio.  Cuándo debe parar y llamar al proveedor de atención médica  Deje de hacer ejercicios y llame a crum proveedor de atención médica de inmediato si tiene cualquiera de estos síntomas:    Dolor, opresión, sensación de tirantez o pesadez en el pecho.    Dolor o pesadez en el gerardo, los hombros, la espalda, los brazos, las piernas o los pies    Falta de aire excesiva    Mareos o aturdimiento    Pulso excesivamente rápido o lento    Mayor dolor en las articulaciones o los músculos    Náuseas o vómito  Date Last Reviewed: 5/1/2016 2000-2018 [employer]. [address] [place] [id number]. Todos los derechos reservados. Esta información no pretende sustituir la atención médica profesional. Sólo crum médico puede diagnosticar y tratar un problema de kirill.           Patient Education     Cómo controlar crum colesterol  El colesterol es cassie  sustancia parecida a la cera que circula en la mini. Cuando el nivel de colesterol es muy alto, se acumula en las jaramillo de los vasos sanguíneos, haciendo que estos se estrechen y se reduzca el flujo de mini. Usted tendrá más probabilidades de padecer un ataque al corazón o al cerebro.  El colesterol healy y el colesterol satinder  Los lípidos son grasas, y la mini está conformada principalmente de agua. Chaim la grasa no se disuelve en el agua, se necesitan lipoproteínas (lípidos envueltos en cassie capa de proteína) para transportar los lípidos. El revestimiento externo de proteína permite que las lipoproteínas entren en la mini con crum carga de lípidos. Existen dos clases principales de lipoproteínas:    Las lipoproteínas LDL (de baja densidad) conocidas también chaim  colesterol satinder . Están compuestas principalmente de colesterol, el cual es depositado en las células del organismo. Si hay demasiado colesterol LDL, grabiel puede acumularse en las jaramillo de las arterias, aumentando el riesgo de trastornos cardíacos y ataques cerebrales.    Las lipoproteínas HDL (de tyler densidad) se conocen chaim  colesterol healy  y consisten principalmente del revestimiento de la proteína. Esta lipoproteína recoge el exceso de colesterol satinder que las lipoproteínas LDL rodríguez dejado en las jaramillo de los vasos sanguíneos. Por eso un nivel alto de colesterol HDL puede reducir el riesgo de trastornos cardíacos y ataques cerebrales.  Cómo controlar los niveles de colesterol  El colesterol total incluye el colesterol LDL y el HDL, así chaim otras grasas presentes en la mini. Si crum nivel total de colesterol es alto, siga los consejos a continuación para ayudar a reducirlo.    Consuma menos grasa no saludable  ? Reduzca el consumo de grasas saturadas y trans (también llamadas hidrogenadas). Cassie dieta ivon en estas grasas aumenta crum nivel de colesterol satinder. Antonio no es suficiente reducir el consumo de alimentos con alto contenido de  colesterol.  ? Coma aproximadamente dos porciones de pescado todas las semanas. La mayoría de pescados contienen ácidos grasos omega-3 que ayudan a reducir el nivel de colesterol en la mini.  ? Coma más alimentos de grano integral que contengan fibra soluble (norma el salvado de devon). Estos alimentos reducen el nivel total de colesterol.    Manténgase activo  ? Elija cassie actividad que le agrade. Caminar, nadar y montar en bicicleta son algunas buenas maneras de mantenerse activo.  ? Comience con un nivel de actividad que le resulte cómodo y vaya aumentando cada semana el tiempo y el ritmo de la actividad.  ? Vaya aumentando hasta hacer entre 30 y 40 minutos de ejercicio físico de moderado a intenso al menos frandy a cuatro días a la semana.  ? Recuerde: es mejor hacer un poco de ejercicio que nada.  ? Si no ha estado haciendo ejercicio regularmente, comience poco a poco. Consulte con crum médico para asegurarse de que crum plan de ejercicio sea adecuado para usted.    Deje de fumar. Dejar de fumar puede mejorar nataly niveles de lípidos. También disminuye el riesgo de trastornos cardíacos y ataques cerebrales.    Maneje crum peso. Si tiene sobrepeso u obesidad, el médico trabajará con usted para perder peso y bajar crum IMC (índice de masa corporal) a un nivel normal o irina normal. Hacer cambios en la dieta y aumentar la actividad física puede ayudar.    [place] los medicamentos según las indicaciones. Muchas personas necesitan medicamentos para reducir nataly niveles de colesterol LDL hasta un nivel que no implique peligro. Los medicamentos para reducir los niveles de colesterol son eficaces y seguros. Recuerde que estos medicamentos no sustituyen el ejercicio ni el seguimiento de cassie dieta beth. Crum proveedor de atención médica le dirá si los medicamentos para bajar el nivel de colesterol podrían ser adecuados para usted.  Date Last Reviewed: 11/6/2017 2000-2018 [employer]. [address] [place] [address], [place]  [id number]. Todos los derechos reservados. Esta información no pretende sustituir la atención médica profesional. Sólo crum médico puede diagnosticar y tratar un problema de kirill.           Patient Education     Diabetes: Planificación de las comidas    Usted puede ayudar a mantener crum nivel de azúcar sanguíneo dentro de los límites recomendados si lleva cassie alimentación beth. Crum equipo de atención médica puede ayudarle a crear un plan de comidas nutritivo y bajo en grasas. Usted puede desempeñar un papel activo en el control de crum diabetes si sigue crum plan de comidas y trabaja con crum equipo de atención médica.  Establezca un plan de comidas  Un plan de comidas charlene las pautas sobre las clases y cantidades de alimentos que debe comer. El objetivo es equilibrar la comida y la insulina (u otros medicamentos para la diabetes) de manera que crum nivel de azúcar en la mini se mantenga dentro de los límites deseados. Crum nutricionista le ayudará a hacer un plan de comidas flexible que incluya muchos de los alimentos que a usted le gustan.  Controle el tamaño de las raciones  Crum plan de comidas agrupará los alimentos por raciones. Para saber cuánto contiene cassie ración, comience por medir las porciones de los alimentos en cada comida. En poco tiempo sabrá cuánto ocupa cassie ración en crum plato. Pregunte a crum proveedor de atención médica cómo equilibrar raciones de alimentos diferentes.  Coma alimentos de todos los grupos  La base de un plan de comidas marisela es la variedad (comer muchos tipos diferentes de alimentos). Escoja kaylee magras, frutas y verduras frescas, granos integrales y productos lácteos bajos en grasa o sin grasa. [place] cassie gran variedad de alimentos proporciona los nutrientes que crum cuerpo necesita y también le puede ayudar a evitar el aburrimiento con crum plan de comidas.  Obtenga información acerca de los carbohidratos, las grasas y las proteínas    Los carbohidratos consisten en féculas, azúcares y fibra, y se  encuentran en muchos alimentos, entre ellos la fruta, el pan, las pastas, la leche y los dulces. De todos los alimentos, los carbohidratos tienen el mayor efecto sobre el nivel de azúcar en la mini. Crum nutricionista puede enseñarle a contar los carbohidratos para saber cuántos carbohidratos ingiere liz cassie comida.    Las grasas tienen el contenido más alto de calorías y el efecto más importante sobre crum peso y sobre el riesgo de trastornos cardíacos. Cuando usted tiene diabetes, es importante que controle crum peso y que tome medidas preventivas contra los problemas cardíacos. Entre los alimentos ricos en grasas se encuentran la leche entera, el queso, los bocadillos (snacks) y los postres.    Las proteínas son importantes para fortalecer y reparar los músculos y los huesos. Escoja alimentos con proteínas bajas en grasa, norma pescado, claras de huevo y anthony sin piel.  Reduzca los azúcares líquidos  Las calorías adicionales procedentes de los refrescos, bebidas para deportistas y jugos de fruta dificultan el mantenimiento de un nivel óptimo de azúcar en la mini. Elimine en lo posible los azúcares líquidos de crum plan de comidas. [place] incluye la mayoría de los jugos de fruta, que suelen tener un alto contenido de azúcar (natural o añadido). En vez de esto, es recomendable que little abundante agua u otras bebidas sin azúcar.  Coma menos grasas  Si necesita perder peso, intente reducir la cantidad de grasa en crum dieta. [place] también puede ayudarle a reducir el nivel de colesterol para mantener más sanos los vasos sanguíneos. Cassie manera de reducir las grasas es utilizar sólo pequeñas cantidades de aceite para cocinar. Enedina cuidadosamente las etiquetas de los alimentos para evitar aquellos que contengan grasas trans, que no son saludables.  El horario de las comidas  Para controlar el nivel de azúcar, lo importante no es solamente lo que come, sino también cuándo lo come. Es posible que deba comer varias comidas  pequeñas, espaciadas de forma regular a lo jose f del día, para mantenerse dentro de los límites recomendados. Por lo tanto, no se salte el desayuno ni espere hasta cassie hora más avanzada en el día para obtener la mayor parte de nataly calorías, ya que esto puede provocar subidas o bajadas demasiado whit del nivel de azúcar en la mini.   Date Last Reviewed: 3/31/2014    [id number] [employer]. [address] [place] [address]. Todos los derechos reservados. Esta información no pretende sustituir la atención médica profesional. Sólo crum médico puede diagnosticar y tratar un problema de kirill.

## 2019-10-30 NOTE — PROGRESS NOTES
Subjective     Lis Kevin is a 59 year old female who presents to clinic today for the following health issues:    HPI   Medication Followup of Metformin Atorvastatin     Taking Medication as prescribed: yes    Side Effects:  None    Medication Helping Symptoms:  yes     PROBLEMS TO ADD ON...  Diabetes Follow-up      How often are you checking your blood sugar? Not at all    What concerns do you have today about your diabetes? None and Other: AIC was higher recently so here to discuss results. She admits that she may have ran out of glipizide for a while so did not take as regular  although she has no problem taking any med's. Could do better wit diet/ exercise      Do you have any of these symptoms? (Select all that apply)  No numbness or tingling in feet.  No redness, sores or blisters on feet.  No complaints of excessive thirst.  No reports of blurry vision.  No significant changes to weight.     Have you had a diabetic eye exam in the last 12 months? Yes- Date of last eye exam: see [facility]     BP Readings from Last 2 Encounters:   10/30/19 128/68   10/28/19 110/76     Hemoglobin A1C (%)   Date Value   10/28/2019 8.5 (H)   06/17/2019 7.8 (H)     LDL Cholesterol Calculated (mg/dL)   Date Value   10/28/2019 155 (H)   09/13/2018 95       Diabetes Management Resources    Hyperlipidemia Follow-Up      Are you having any of the following symptoms? (Select all that apply)  No complaints of shortness of breath, chest pain or pressure.  No increased sweating or nausea with activity.  No left-sided neck or arm pain.  No complaints of pain in calves when walking 1-2 blocks.    Are you regularly taking any medication or supplement to lower your cholesterol?   No she was out of med's for a while so recent result were higher but willing to go back on it     Are you having muscle aches or other side effects that you think could be caused by your cholesterol lowering medication?  No        Patient Active Problem List    Diagnosis     Abnormal weight gain     Dyspepsia and other specified disorders of function of stomach     Other acne     Calcific tendonitis     Abnormal LFTs     Other postprocedural status(V45.89)     Dysmetabolic syndrome X     Hyperlipidemia LDL goal <100     Pain in joint, shoulder region     Dry eyes     Hyperplastic colon polyp     Serrated adenoma of colon     Backache     Gall stone     DM type 2 goal A1C below 7.5     Fatty liver     Type 2 diabetes mellitus without complication (H)     Obesity, Class II, BMI 35-39.9, with comorbidity     Symptomatic menopausal or female climacteric states     Bilateral thumb pain     Type 2 diabetes mellitus without complication, without long-term current use of insulin (H)     Morbid (severe) obesity due to excess calories (H)     Past Surgical History:   Procedure Laterality Date     C DEXA INTERPRETATION, AXIAL  8/10    normal bone mineral density      C NONSPECIFIC PROCEDURE      BLT     COLONOSCOPY  3/12    repeat in 2015       Social History     Tobacco Use     Smoking status: Never Smoker     Smokeless tobacco: Never Used   Substance Use Topics     Alcohol use: No     Family History   Problem Relation Age of Onset     Hypertension Mother      Diabetes Sister      Hyperlipidemia No family hx of      Coronary Artery Disease No family hx of      Cancer - colorectal No family hx of      Breast Cancer No family hx of            Reviewed and updated as needed this visit by Provider         Review of Systems   ROS COMP: Constitutional, HEENT, cardiovascular, pulmonary, GI, , musculoskeletal, neuro, skin, endocrine and psych systems are negative, except as otherwise noted.      Objective    LMP 06/10/2009   There is no height or weight on file to calculate BMI.  Physical Exam   GENERAL: healthy, alert and no distress  RESP: lungs clear to auscultation - no rales, rhonchi or wheezes  CV: regular rate and rhythm, normal S1 S2, no S3 or S4, no murmur, click or rub, no  "peripheral edema and peripheral pulses strong  PSYCH: mentation appears normal, affect normal/bright            Assessment & Plan     (E78.5) Hyperlipidemia LDL goal <100  Comment: not inadequate control   Plan: atorvastatin (LIPITOR) 10 MG tablet            (E11.9) Type 2 diabetes mellitus without complication, without long-term current use of insulin (H)  Comment: AIC above goal   Plan: metFORMIN (GLUCOPHAGE) 500 MG tablet, glipiZIDE        (GLUCOTROL) 5 MG tablet, AMBULATORY ADULT         DIABETES EDUCATOR REFERRAL           Discussed cares, diet/ exercise . Talked about DM management. compliance , health risk etc. gave refill on med's. Discussed recent  Labs.  Referral given to see  MTM/ diabetic educator for annual follow up. Follow up as needed         BMI:   Estimated body mass index is 35.07 kg/m  as calculated from the following:    Height as of this encounter: 1.513 m (4' 11.57\").    Weight as of this encounter: 80.3 kg (177 lb).   Weight management plan: Discussed healthy diet and exercise guidelines            Return in about 3 months (around 1/30/2020) for sooner if problem .    Rachna Aden MD  [facility] [place]    "

## 2019-10-31 ENCOUNTER — TELEPHONE (OUTPATIENT)
Dept: FAMILY MEDICINE | Facility: CLINIC | Age: 59
End: 2019-10-31

## 2019-10-31 DIAGNOSIS — K21.9 GASTROESOPHAGEAL REFLUX DISEASE, ESOPHAGITIS PRESENCE NOT SPECIFIED: ICD-10-CM

## 2019-10-31 DIAGNOSIS — R05.9 COUGH: ICD-10-CM

## 2019-10-31 DIAGNOSIS — E11.9 TYPE 2 DIABETES MELLITUS WITHOUT COMPLICATION, WITHOUT LONG-TERM CURRENT USE OF INSULIN (H): ICD-10-CM

## 2019-10-31 NOTE — TELEPHONE ENCOUNTER
Diabetes Education Scheduling Outreach #1:    Call to patient to schedule. Patient declined to schedule and wants to hold off for now. She was informed by her provider that scheduling would be contacting her insurance. Unfortunately, we do not contact insurance companies to verify visit or monitor coverage. Patient is going to contact Medicare first and will call us to schedule later.    Gia Mcmillan  [facility]  Diabetes and Nutrition Scheduling

## 2019-10-31 NOTE — TELEPHONE ENCOUNTER
Requested Prescriptions   Pending Prescriptions Disp Refills     metFORMIN (GLUCOPHAGE) 500 MG tablet [Pharmacy Med Name: METFORMIN 500MG TABLETS] 180 tablet 0     Sig: TAKE 2 TABLETS(1000 MG) BY MOUTH TWICE DAILY WITH MEALS   Last Written Prescription Date:  10/30/2019  Last Fill Quantity: 180,  # refills: 1  Last office visit: 10/30/2019 with prescribing provider:   Markie   Future Office Visit:    Duplicate Rx       Biguanide Agents Passed - 10/31/2019  4:09 AM        Passed - Blood pressure less than 140/90 in past 6 months     BP Readings from Last 3 Encounters:   10/30/19 128/68   10/28/19 110/76   06/17/19 126/84                 Passed - Patient has documented LDL within the past 12 mos.     Recent Labs   Lab Test 10/28/19  1054   *             Passed - Patient has had a Microalbumin in the past 15 mos.     Recent Labs   Lab Test 10/28/19  1102   MICROL 21   UMALCR 14.79             Passed - Patient is age 10 or older        Passed - Patient has documented A1c within the specified period of time.     If HgbA1C is 8 or greater, it needs to be on file within the past 3 months.  If less than 8, must be on file within the past 6 months.     Recent Labs   Lab Test 10/28/19  1054   A1C 8.5*             Passed - Patient's CR is NOT>1.4 OR Patient's EGFR is NOT<45 within past 12 mos.     Recent Labs   Lab Test 10/28/19  1054   GFRESTIMATED >90   GFRESTBLACK >90       Recent Labs   Lab Test 10/28/19  1054   CR 0.52             Passed - Patient does NOT have a diagnosis of CHF.        Passed - Medication is active on med list        Passed - Patient is not pregnant        Passed - Patient has not had a positive pregnancy test within the past 12 mos.         Passed - Recent (6 mo) or future (30 days) visit within the authorizing provider's specialty     Patient had office visit in the last 6 months or has a visit in the next 30 days with authorizing provider or within the authorizing provider's specialty.  See  "\"Patient Info\" tab in inbasket, or \"Choose Columns\" in Meds & Orders section of the refill encounter.          Refused Prescriptions Disp Refills     omeprazole (PRILOSEC) 20 MG DR capsule [Pharmacy Med Name: OMEPRAZOLE 20MG CAPSULES] 90 capsule 0     Sig: TAKE 1 CAPSULE(20 MG) BY MOUTH DAILY   Last Written Prescription Date:  9/13/2018  Last Fill Quantity: 90,  # refills: 3   Last office visit: 10/30/2019 with prescribing provider:  Markie    Future Office Visit:        PPI Protocol Passed - 10/31/2019  4:09 AM        Passed - Not on Clopidogrel (unless Pantoprazole ordered)        Passed - No diagnosis of osteoporosis on record        Passed - Recent (12 mo) or future (30 days) visit within the authorizing provider's specialty     Patient has had an office visit with the authorizing provider or a provider within the authorizing providers department within the previous 12 mos or has a future within next 30 days. See \"Patient Info\" tab in inbasket, or \"Choose Columns\" in Meds & Orders section of the refill encounter.              Passed - Medication is active on med list        Passed - Patient is age 18 or older        Passed - No active pregnacy on record        Passed - No positive pregnancy test in past 12 months          "

## 2019-10-31 NOTE — TELEPHONE ENCOUNTER
Metformin ordered yesterday at OV. Refused as duplicate.   Omeprazole - Prescription approved per [facility] Refill Protocol.  Linsey Berry RN

## 2019-11-01 ENCOUNTER — TELEPHONE (OUTPATIENT)
Dept: FAMILY MEDICINE | Facility: CLINIC | Age: 59
End: 2019-11-01

## 2019-11-01 LAB
FINAL DIAGNOSIS: NORMAL
HPV HR 12 DNA CVX QL NAA+PROBE: NEGATIVE
HPV16 DNA SPEC QL NAA+PROBE: NEGATIVE
HPV18 DNA SPEC QL NAA+PROBE: NEGATIVE
SPECIMEN DESCRIPTION: NORMAL
SPECIMEN SOURCE CVX/VAG CYTO: NORMAL

## 2019-11-01 NOTE — LETTER
November 7, 2019      Lis Kevin  [address] [place] [address]        Dear Lis,    I care about your health and have reviewed your health plan. I have reviewed your medical conditions, medication list, and lab results and am making recommendations based on this review, to better manage your health.    You are in particular need of attention regarding:  -Diabetes    I am recommending that you:  -Schedule an appointment    Here is a list of Health Maintenance topics that are due now or due soon:  Health Maintenance Due   Topic Date Due     Zoster (Shingles) Vaccine (1 of 2) 03/05/2012       Please call us at [phone] (or use [employer]) to address the above recommendations.     Thank you for trusting [facility] and we appreciate the opportunity to serve you.  We look forward to supporting your healthcare needs in the future.    Healthy Regards,    Rachna Aden MD

## 2019-11-01 NOTE — TELEPHONE ENCOUNTER
MTM referral from: [facility] visit (referral by provider)    MTM referral outreach attempt #2 on November 1, 2019 at 10:40 AM      Outcome: Patient not reachable after several attempts, will route to MTM Pharmacist/Provider as an FYI. Thank you for the referral.    Kristen Escalante, MTM Coordinator

## 2019-11-05 NOTE — TELEPHONE ENCOUNTER
Routing to TC to advise on MD note below. Thank you. Looks like Dr. Aden wanted this routed to .     Aixa Jefferson RN, BSN  [facility]

## 2019-11-05 NOTE — TELEPHONE ENCOUNTER
Left message for patient to call back to discuss.   Not sure if she would understand my message in english. It is good idea for pt to schedule at least either diabetic educator or MTM, to help her improve DM control as her dm is not in adequate control

## 2019-11-06 ENCOUNTER — TRANSFERRED RECORDS (OUTPATIENT)
Dept: HEALTH INFORMATION MANAGEMENT | Facility: CLINIC | Age: 59
End: 2019-11-06

## 2019-11-06 NOTE — TELEPHONE ENCOUNTER
Called patient with interp, left a non detailed vm to call clinic.      .Sadie RODAS    [facility] [place]

## 2019-11-07 NOTE — TELEPHONE ENCOUNTER
Can PCP please clarify message below. Do you want patient to cancel OV for 11/13/19? Which meds would you like patient to go back on or is she supposed to just continue metformin and glipizide?     Lazara Bee RN   [facility] - Triage

## 2019-11-07 NOTE — TELEPHONE ENCOUNTER
Called patient with [place] inter[employer] and left a vm to contact clinic to speak with Tc.      .Sadie RODAS    [facility] [place]

## 2019-11-07 NOTE — TELEPHONE ENCOUNTER
Sorry I cannot help without knowing what is going on with her blood sugars    it may be too soon to even check her AIC, so might as well go back on all her med's , continue to watch diet/ exercise and wait for follow up visit at least 8-12  Weeks,  so we can do labs.   But she can come sooner if she is having concerning sx or problem   Please give her some GoodRX coupon , not sure if it will help

## 2019-11-07 NOTE — TELEPHONE ENCOUNTER
Called patient with [place] inter[employer] and left a vm to return clinic call.  TC will mail letter.    .Sadie RODAS    [facility] [place]

## 2019-11-07 NOTE — TELEPHONE ENCOUNTER
Routing to  to advise on Dr. Aden's note below.     Aixa Jefferson RN, BSN  [facility]

## 2019-11-07 NOTE — TELEPHONE ENCOUNTER
Yes, no need for appointment next week, unless she is having any issues or problems.   If the coupons help, and  is able to get her supplies and her sugars are running higher than  she can return sooner.  Otherwise if  is feeling okay.  We will do follow-up check in couple months and do the labs at that time.   Patient was reminded patient to be aggressive with the diet and exercise and compliant with all the medications.

## 2019-11-07 NOTE — TELEPHONE ENCOUNTER
Scheduled with Dr. Aden 11/13 patient states her insurance does not cover for her to see MTM.      .Sadie RODAS    [facility] [place]

## 2019-11-07 NOTE — TELEPHONE ENCOUNTER
Ok. Please tell her check sugars more frequent   and bring reading with to review and we help her adjust med's

## 2019-11-07 NOTE — TELEPHONE ENCOUNTER
Left non detailed message for patient to return call via   services.     Lazara Bee RN   [facility] - Triage

## 2019-11-08 NOTE — TELEPHONE ENCOUNTER
[place] : 2nd attempt: Left a non-detailed message and call back number [phone].     Dr. Aden's responses below:     2nd message in response to Lazara MALDONADO: Yes, no need for appointment next week, unless she is having any issues or problems.   If the coupons help, and  is able to get her supplies and her sugars are running higher than  she can return sooner.  Otherwise if  is feeling okay.  We will do follow-up check in couple months and do the labs at that time.   Patient was reminded patient to be aggressive with the diet and exercise and compliant with all the medications.     Can PCP please clarify message below. Do you want patient to cancel OV for 11/13/19? Which meds would you like patient to go back on or is she supposed to just continue metformin and glipizide?     Lazara Bee RN   [facility] - Triage     1st message: Sorry I cannot help without knowing what is going on with her blood sugars    it may be too soon to even check her AIC, so might as well go back on all her med's , continue to watch diet/ exercise and wait for follow up visit at least 8-12  Weeks,  so we can do labs.   But she can come sooner if she is having concerning sx or problem   Please give her some GoodRX coupon , not sure if it will help       Aixa Jefferson RN, BSN  [facility]3]-[place]

## 2019-11-11 NOTE — TELEPHONE ENCOUNTER
Called patient via   and informed her of Dr. Aden's notes below. Patient was informed she can come to the clinic to  RX coupons and patient has canceled her appointment and advised to follow up in 8-12 weeks. Patient verbalized understanding and agrees with plan.     Aixa Jefferson RN, BSN  [facility]

## 2019-11-13 ENCOUNTER — TELEPHONE (OUTPATIENT)
Dept: FAMILY MEDICINE | Facility: CLINIC | Age: 59
End: 2019-11-13

## 2019-11-14 NOTE — TELEPHONE ENCOUNTER
PA Initiation    Medication: blood glucose (ACCU-CHEK HOME PLUS) test strip  Insurance Company: [employer] - Phone [phone] Fax [phone]  Pharmacy Filling the Rx: [id number] - [place] - [address] AT [facility] [address]  Filling Pharmacy Phone: [phone]  Filling Pharmacy Fax:    Start Date: 11/14/2019    Central Prior Authorization Team   Phone: [phone]

## 2019-11-14 NOTE — TELEPHONE ENCOUNTER
Prior Authorization Retail Medication Request    Medication/Dose: blood glucose (ACCU-CHEK HOME PLUS) test strip  ICD code (if different than what is on RX):    Previously Tried and Failed:    Rationale:      Insurance Name:  NA  Insurance ID:  NA      Pharmacy Information (if different than what is on RX)  Name:  [facility] [address] [place]  Phone:  [phone]      We need a CMN form filled out and sent back to us

## 2019-11-19 ENCOUNTER — MEDICAL CORRESPONDENCE (OUTPATIENT)
Dept: HEALTH INFORMATION MANAGEMENT | Facility: CLINIC | Age: 59
End: 2019-11-19

## 2019-11-19 ENCOUNTER — ANCILLARY PROCEDURE (OUTPATIENT)
Dept: MAMMOGRAPHY | Facility: CLINIC | Age: 59
End: 2019-11-19
Payer: COMMERCIAL

## 2019-11-19 DIAGNOSIS — Z12.31 BREAST CANCER SCREENING BY MAMMOGRAM: ICD-10-CM

## 2019-11-19 DIAGNOSIS — Z00.00 ROUTINE HISTORY AND PHYSICAL EXAMINATION OF ADULT: ICD-10-CM

## 2019-11-19 PROCEDURE — 77067 SCR MAMMO BI INCL CAD: CPT | Mod: TC

## 2019-11-19 NOTE — TELEPHONE ENCOUNTER
Prior Authorization Not Needed per Insurance    Medication: blood glucose (ACCU-CHEK HOME PLUS) test strip  Insurance Company: [employer] - Phone [phone] Fax [phone]  Expected CoPay:      Pharmacy Filling the Rx: [id number] [place] [place] [address] AT [facility] [address] [facility] [address]  Pharmacy Notified: Yes- Called pharmacy and relayed response back from insurance that pa is not needed, and that if after calling their help desk number they are still having problems to call me back.

## 2020-01-07 ENCOUNTER — TELEPHONE (OUTPATIENT)
Dept: FAMILY MEDICINE | Facility: CLINIC | Age: 60
End: 2020-01-07

## 2020-01-07 NOTE — TELEPHONE ENCOUNTER
Needs of attention regarding:  -Diabetes    Health Maintenance Topics with due status: Overdue       Topic Date Due    ZOSTER IMMUNIZATION 03/05/2012    PHQ-2 01/01/2020     Health Maintenance Topics with due status: Due Soon       Topic Date Due    EYE EXAM 01/28/2020       Communication:  Please abstract the following data from this visit with this patient into the appropriate field in Epic:    Other Tests found in the patient's chart through Chart Review/Care Everywhere:    Eye exam with ophthalmology on this date: 1/28/19 - No retinopathy - [facility] - Hard copy in Media     Note to [employer]straction: If this section is blank, no results were found via Chart Review/Care Everywhere.

## 2020-02-13 NOTE — PROGRESS NOTES
Injectable Influenza Immunization Documentation    1.  Is the person to be vaccinated sick today?   No    2. Does the person to be vaccinated have an allergy to a component   of the vaccine?   No  Egg Allergy Algorithm Link    3. Has the person to be vaccinated ever had a serious reaction   to influenza vaccine in the past?   No    4. Has the person to be vaccinated ever had Guillain-Barré syndrome?   No  Prior to injection verified patient identity using patient's name and date of birth.  Due to injection administration, patient instructed to remain in clinic for 15 minutes  afterwards, and to report any adverse reaction to me immediately.      Form completed by Olga Gannon CMA            3/5/2020 at 10 am

## 2020-05-04 ENCOUNTER — TELEPHONE (OUTPATIENT)
Dept: FAMILY MEDICINE | Facility: CLINIC | Age: 60
End: 2020-05-04

## 2020-05-04 NOTE — TELEPHONE ENCOUNTER
"Patient did not want to be seen by \"telephone visit\" and charged for it but rather get a refill on her medication. I explained to her multiple times through an  that she will have to discuss this with her provider as I cannot approve or fill her medication. Pt refused to continue telephone visit and hung up the call.   "

## 2020-05-04 NOTE — TELEPHONE ENCOUNTER
Rxs for glipizide and atorvastatin were sent on 4/28 for 30 day supply.    Brook GRIDER RN  EP Triage

## 2021-04-24 ENCOUNTER — HOSPITAL ENCOUNTER (EMERGENCY)
Facility: CLINIC | Age: 61
End: 2021-04-24
Payer: MEDICARE

## 2022-09-14 NOTE — TELEPHONE ENCOUNTER
[place] : Called patient to inform her of MD response below. Patient states that her insurance is not covering the supplies (test strips, lancets) and patient does not want to pay out of pocket, but patient does have a blood glucose monitor. So patient is unable to check her blood sugar do to not being able to get the supplies.       Routing to Dr. Aden as FYI. Patient will see Dr. Aden 11/13/19.     Aixa Jefferson RN, BSN  [facility]         14-Sep-2022 17:30